# Patient Record
Sex: FEMALE | Race: ASIAN | NOT HISPANIC OR LATINO | URBAN - METROPOLITAN AREA
[De-identification: names, ages, dates, MRNs, and addresses within clinical notes are randomized per-mention and may not be internally consistent; named-entity substitution may affect disease eponyms.]

---

## 2019-08-13 ENCOUNTER — INPATIENT (INPATIENT)
Facility: HOSPITAL | Age: 84
LOS: 1 days | Discharge: HOME | DRG: 65 | End: 2019-08-15
Attending: PSYCHIATRY & NEUROLOGY | Admitting: PSYCHIATRY & NEUROLOGY
Payer: MEDICAID

## 2019-08-13 VITALS
RESPIRATION RATE: 15 BRPM | SYSTOLIC BLOOD PRESSURE: 162 MMHG | DIASTOLIC BLOOD PRESSURE: 92 MMHG | TEMPERATURE: 97 F | OXYGEN SATURATION: 98 % | HEART RATE: 78 BPM

## 2019-08-13 DIAGNOSIS — R09.89 OTHER SPECIFIED SYMPTOMS AND SIGNS INVOLVING THE CIRCULATORY AND RESPIRATORY SYSTEMS: ICD-10-CM

## 2019-08-13 DIAGNOSIS — Z90.710 ACQUIRED ABSENCE OF BOTH CERVIX AND UTERUS: Chronic | ICD-10-CM

## 2019-08-13 LAB
ALBUMIN SERPL ELPH-MCNC: 4 G/DL — SIGNIFICANT CHANGE UP (ref 3.3–5)
ALP SERPL-CCNC: 65 U/L — SIGNIFICANT CHANGE UP (ref 40–120)
ALT FLD-CCNC: 24 U/L — SIGNIFICANT CHANGE UP (ref 10–45)
ANION GAP SERPL CALC-SCNC: 9 MMOL/L — SIGNIFICANT CHANGE UP (ref 5–17)
APPEARANCE UR: CLEAR — SIGNIFICANT CHANGE UP
APTT BLD: 22.1 SEC — LOW (ref 27.5–36.3)
AST SERPL-CCNC: 36 U/L — SIGNIFICANT CHANGE UP (ref 10–40)
BACTERIA # UR AUTO: NEGATIVE — SIGNIFICANT CHANGE UP
BASOPHILS # BLD AUTO: 0 K/UL — SIGNIFICANT CHANGE UP (ref 0–0.2)
BASOPHILS NFR BLD AUTO: 0.4 % — SIGNIFICANT CHANGE UP (ref 0–2)
BILIRUB SERPL-MCNC: 0.2 MG/DL — SIGNIFICANT CHANGE UP (ref 0.2–1.2)
BILIRUB UR-MCNC: NEGATIVE — SIGNIFICANT CHANGE UP
BUN SERPL-MCNC: 12 MG/DL — SIGNIFICANT CHANGE UP (ref 7–23)
CALCIUM SERPL-MCNC: 9.4 MG/DL — SIGNIFICANT CHANGE UP (ref 8.4–10.5)
CHLORIDE SERPL-SCNC: 103 MMOL/L — SIGNIFICANT CHANGE UP (ref 96–108)
CO2 SERPL-SCNC: 25 MMOL/L — SIGNIFICANT CHANGE UP (ref 22–31)
COLOR SPEC: COLORLESS — SIGNIFICANT CHANGE UP
CREAT SERPL-MCNC: 0.85 MG/DL — SIGNIFICANT CHANGE UP (ref 0.5–1.3)
DIFF PNL FLD: NEGATIVE — SIGNIFICANT CHANGE UP
EOSINOPHIL # BLD AUTO: 0.1 K/UL — SIGNIFICANT CHANGE UP (ref 0–0.5)
EOSINOPHIL NFR BLD AUTO: 1.6 % — SIGNIFICANT CHANGE UP (ref 0–6)
EPI CELLS # UR: 1 /HPF — SIGNIFICANT CHANGE UP
GLUCOSE SERPL-MCNC: 105 MG/DL — HIGH (ref 70–99)
GLUCOSE UR QL: NEGATIVE — SIGNIFICANT CHANGE UP
HCT VFR BLD CALC: 41.4 % — SIGNIFICANT CHANGE UP (ref 34.5–45)
HGB BLD-MCNC: 13.6 G/DL — SIGNIFICANT CHANGE UP (ref 11.5–15.5)
HYALINE CASTS # UR AUTO: 0 /LPF — SIGNIFICANT CHANGE UP (ref 0–2)
INR BLD: 0.91 RATIO — SIGNIFICANT CHANGE UP (ref 0.88–1.16)
KETONES UR-MCNC: NEGATIVE — SIGNIFICANT CHANGE UP
LEUKOCYTE ESTERASE UR-ACNC: NEGATIVE — SIGNIFICANT CHANGE UP
LYMPHOCYTES # BLD AUTO: 2.2 K/UL — SIGNIFICANT CHANGE UP (ref 1–3.3)
LYMPHOCYTES # BLD AUTO: 35.5 % — SIGNIFICANT CHANGE UP (ref 13–44)
MCHC RBC-ENTMCNC: 32.1 PG — SIGNIFICANT CHANGE UP (ref 27–34)
MCHC RBC-ENTMCNC: 32.8 GM/DL — SIGNIFICANT CHANGE UP (ref 32–36)
MCV RBC AUTO: 97.9 FL — SIGNIFICANT CHANGE UP (ref 80–100)
MONOCYTES # BLD AUTO: 0.6 K/UL — SIGNIFICANT CHANGE UP (ref 0–0.9)
MONOCYTES NFR BLD AUTO: 9 % — SIGNIFICANT CHANGE UP (ref 2–14)
NEUTROPHILS # BLD AUTO: 3.3 K/UL — SIGNIFICANT CHANGE UP (ref 1.8–7.4)
NEUTROPHILS NFR BLD AUTO: 53.5 % — SIGNIFICANT CHANGE UP (ref 43–77)
NITRITE UR-MCNC: NEGATIVE — SIGNIFICANT CHANGE UP
PH UR: 7 — SIGNIFICANT CHANGE UP (ref 5–8)
PLATELET # BLD AUTO: 186 K/UL — SIGNIFICANT CHANGE UP (ref 150–400)
POTASSIUM SERPL-MCNC: 4.7 MMOL/L — SIGNIFICANT CHANGE UP (ref 3.5–5.3)
POTASSIUM SERPL-SCNC: 4.7 MMOL/L — SIGNIFICANT CHANGE UP (ref 3.5–5.3)
PROT SERPL-MCNC: 6.9 G/DL — SIGNIFICANT CHANGE UP (ref 6–8.3)
PROT UR-MCNC: NEGATIVE — SIGNIFICANT CHANGE UP
PROTHROM AB SERPL-ACNC: 10.4 SEC — SIGNIFICANT CHANGE UP (ref 10–12.9)
RBC # BLD: 4.23 M/UL — SIGNIFICANT CHANGE UP (ref 3.8–5.2)
RBC # FLD: 12.7 % — SIGNIFICANT CHANGE UP (ref 10.3–14.5)
RBC CASTS # UR COMP ASSIST: 2 /HPF — SIGNIFICANT CHANGE UP (ref 0–4)
SODIUM SERPL-SCNC: 137 MMOL/L — SIGNIFICANT CHANGE UP (ref 135–145)
SP GR SPEC: 1.02 — SIGNIFICANT CHANGE UP (ref 1.01–1.02)
UROBILINOGEN FLD QL: NEGATIVE — SIGNIFICANT CHANGE UP
WBC # BLD: 6.2 K/UL — SIGNIFICANT CHANGE UP (ref 3.8–10.5)
WBC # FLD AUTO: 6.2 K/UL — SIGNIFICANT CHANGE UP (ref 3.8–10.5)
WBC UR QL: 2 /HPF — SIGNIFICANT CHANGE UP (ref 0–5)

## 2019-08-13 PROCEDURE — 0042T: CPT

## 2019-08-13 PROCEDURE — 70450 CT HEAD/BRAIN W/O DYE: CPT | Mod: 26

## 2019-08-13 PROCEDURE — 99291 CRITICAL CARE FIRST HOUR: CPT

## 2019-08-13 RX ORDER — SODIUM CHLORIDE 9 MG/ML
1000 INJECTION INTRAMUSCULAR; INTRAVENOUS; SUBCUTANEOUS
Refills: 0 | Status: DISCONTINUED | OUTPATIENT
Start: 2019-08-13 | End: 2019-08-15

## 2019-08-13 NOTE — H&P ADULT - NSHPLABSRESULTS_GEN_ALL_CORE
13.6   6.2   )-----------( 186      ( 13 Aug 2019 22:40 )             41.4   08-13    137  |  103  |  12  ----------------------------<  105<H>  4.7   |  25  |  0.85    Ca    9.4      13 Aug 2019 22:40    TPro  6.9  /  Alb  4.0  /  TBili  0.2  /  DBili  x   /  AST  36  /  ALT  24  /  AlkPhos  65  08-13  PT/INR - ( 13 Aug 2019 22:40 )   PT: 10.4 sec;   INR: 0.91 ratio         PTT - ( 13 Aug 2019 22:40 )  PTT:22.1 sec    < from: CT Brain Stroke Protocol (08.13.19 @ 23:01) >    IMPRESSION:   No acute intracranial hemorrhage in this patient status post TPA   administration for left middle cerebral artery occlusion. Early ischemic   change in the anterior left insular cortex and posterior limb of the   internal capsule.    < end of copied text >

## 2019-08-13 NOTE — H&P ADULT - HISTORY OF PRESENT ILLNESS
84 year old ?RH [place] citizen lady with Hx of Seizures on dilantin, HTN, HLD, and hysterectomy presenting to the ED with son-in-law c/o sudden onset verbal unresponsiveness at 19:00 today. Per son in law, [place] [facility] 1.5 days ago. Patient was watching TV with her grandchildren earlier this evening, when they notice she was not responding to them. 84 year old ?RH Guyanese lady (reports her name as Abbie) with Hx of Seizures on dilantin, HTN, HLD, and hysterectomy presented to the [facility] ED with son-in-law c/o sudden onset verbal unresponsiveness at 19:00 today. Per son in law, [place] [facility] 1.5 days ago. Patient was watching TV with her grandchildren earlier this evening, when they notice she was not responding to them. Initial NIHSS deemed to be 12. CTH/CTA showed distal LM1 occlusion + dense MCA sign. Given TPA and transferred to [facility] for possible endovascular. On arrival, pt alert awake, states she was feeling very drowsy night prior; responding to most questions in limited english (Maori  not available from pacific interpreters), crossed midline, mild R nasolabial flattening, otherwise symmetrically full strength on gross motor exam. CTP study w/o any mismatch or core; combined with significant neurological improvement; pt excluded from endovascular intervention. Reported baseline independent of ADLs. No family available at bedside and multiple attempts to reach son-in-law via [phone] number unsuccessful. Admit to stroke unit for further management. NIHSS =1, MRS =0    **Possible error in CTA report from [facility] - mentions RM1 occlusion in impression as well as LMca 84 year old ?RH Omani lady (reports her name as Abbie) with Hx of Seizures on dilantin, HTN, HLD, and hysterectomy presented to the [facility] ED with son-in-law c/o sudden onset verbal unresponsiveness at 19:00 today. Per son in law, [place] [facility] 1.5 days ago. Patient was watching TV with her grandchildren earlier this evening, when they notice she was not responding to them. Initial NIHSS deemed to be 12. CTH/CTA showed distal LM1 occlusion + dense MCA sign. Given TPA and transferred to [facility] for possible endovascular. On arrival, pt alert awake, states she was feeling very drowsy night prior; responding to most questions in limited english (Persian  not available from pacific interpreters), crossed midline, mild R nasolabial flattening, otherwise symmetrically full strength on gross motor exam. CTP study w/o any mismatch or core; combined with significant neurological improvement; pt excluded from endovascular intervention. Reported baseline independent of ADLs. No family available at bedside and multiple attempts to reach son-in-law via [phone] number unsuccessful. Admit to stroke unit for further management. NIHSS =1, MRS =0    **Possible error in CTA report from [facility] - mentions RM1 occlusion in impression as well as LMca   [facility] name : Casandra Ortega MRN #[id number] 84 year old ?RH Fijian lady  with Hx of Seizures on dilantin, HTN, HLD, CAD s/p PPM and hysterectomy presented to the [facility] ED with son-in-law c/o sudden onset verbal unresponsiveness at 19:00 today. Per son in law, [place] [facility] 1.5 days ago. Patient was watching TV with her grandchildren earlier this evening, when they notice she was not responding to them. Initial NIHSS deemed to be 12. CTH/CTA showed distal LM1 occlusion + dense MCA sign. Given TPA and transferred to [facility] for possible endovascular. On arrival, pt alert awake, states she was feeling very drowsy night prior; responding to most questions in limited english (Georgian  not available from pacific interpreters), crossed midline, mild R nasolabial flattening, otherwise symmetrically full strength on gross motor exam. CTP study w/o any mismatch or core; combined with significant neurological improvement; pt excluded from endovascular intervention. Reported baseline independent of ADLs. Admit to stroke unit for further management. NIHSS =1, MRS =0    **Possible error in CTA report from [facility] - mentions RM1 occlusion in impression as well as LMca   [facility] name : Casandra Ortega MRN #[id number]

## 2019-08-13 NOTE — ED ADULT NURSE NOTE - OBJECTIVE STATEMENT
83 y/o Maldivian speaking female presents to ED via EMS as transfer Code Stroke from [facility]. As per EMS,1900 was last seen normal, pt became aphasic and minimally responsive to family members with limited movement of b/l lower extremities. TPA started at 2045, infusion complete at approximately t 2140 as per EMS. Upon arrival to [facility], Code Stroke called at 2220. Pt alert and following commands. +assymetrical smile, +sensation to touch. +PERRLA 2mm, +strong hand  b/l, weak movement of lower extremities up b/l.   Non labored respirations, no noted cough, SOB, or edema. Safety measures maintained. 85 y/o Canadian speaking female with hx of seizures and HTN presents to ED via EMS as transfer Code Stroke from [facility]. As per EMS,1900 was last seen normal, pt became aphasic and minimally responsive to family members with limited movement of b/l lower extremities. TPA started at 2045, infusion complete at approximately 2140 as per EMS. Upon arrival to [facility], Code Stroke called at 2220. Pt alert and following commands. +asymmetrical smile, +sensation to touch, +PERRLA 2mm. +ROMx4 extremities, +strong hand  b/l, weak movement of lower extremities up b/l. Non labored respirations, no noted cough, SOB, or edema. Safety measures maintained. 83 y/o Finnish speaking female with hx of seizures and HTN presents to ED via EMS as transfer Code Stroke from [facility]. As per EMS,1900 was last seen normal, pt became aphasic and minimally responsive to family members with limited movement of b/l lower extremities. TPA started at 2045 at [facility], infusion complete at approximately 2140 as per EMS. Upon arrival to [facility], Code Stroke called at 2220. Pt alert and following commands, speaks some broken English and understands when speaking with. +asymmetrical smile, +sensation to touch, +PERRLA 2mm. +ROMx4 extremities, +strong hand  b/l, +flexion/extension b/l. Non labored respirations, no noted cough, SOB, or edema. No fevers, chills, n/v/d. Pt does not remember aphasic episode earlier but states that they felt some right neck pain last night, currently no c/o of pain, numbness, tingling or weakness. Safety measures maintained. 83 y/o Icelandic speaking female with hx of seizures and HTN presents to ED via EMS as transfer Code Stroke from [facility]. As per EMS,1900 was last seen normal, pt became aphasic and minimally responsive to family members with limited movement of b/l lower extremities. As per EMS and transfer documentation TPA bolus 5.7mg given at 2045, infusion 51.4mg started 2045 and complete at approximately 2140 as per EMS. Upon arrival to [facility], Code Stroke called at 2220. Pt alert and following commands, speaks some broken English and understands when speaking with. +asymmetrical smile, +sensation to touch, +PERRLA 2mm. +ROMx4 extremities, +strong hand  b/l, +flexion/extension b/l. Non labored respirations, no noted cough, SOB, or edema. No fevers, chills, n/v/d. Pt does not remember aphasic episode earlier but states that they felt some right neck pain last night, currently no c/o of pain, numbness, tingling or weakness. Safety measures maintained. 85 y/o Cymraes speaking female with hx of seizures and HTN presents to ED via EMS as transfer Code Stroke from [facility]. As per EMS,1900 was last seen normal, pt became aphasic and minimally responsive to family members with limited movement of b/l lower extremities. As per EMS and transfer documentation TPA bolus 5.7mg given at 2045, infusion 51.4mg started at 2045 and completec at approximately 2140 as per EMS. Upon arrival to [facility], Code Stroke called at 2220. Pt alert and following commands, speaks some broken English and understands when speaking with. +asymmetrical smile, +sensation to touch, +PERRLA 2mm. +ROMx4 extremities, +strong hand  b/l, +flexion/extension b/l. Non labored respirations, no noted cough, SOB, or edema. No fevers, chills, n/v/d. Pt does not remember aphasic episode earlier but states that they felt some right neck pain last night, currently no c/o of pain, numbness, tingling or weakness. Safety measures maintained. 85 y/o Belarusian speaking female with hx of seizures and HTN presents to ED via EMS as transfer Code Stroke from [facility]. As per EMS,1900 was last seen normal, pt became aphasic and minimally responsive to family members with limited movement of b/l lower extremities. As per EMS and transfer documentation TPA bolus 5.7mg given at 2045, infusion 51.4mg started at 2045 and completed at approximately 2140 as per EMS. Upon arrival to [facility], Code Stroke called at 2220. Pt alert and following commands, speaks some broken English and understands when speaking with. +asymmetrical smile, +sensation to touch, +PERRLA 2mm. +ROMx4 extremities, +strong hand  b/l, +flexion/extension b/l. Non labored respirations, no noted cough, SOB, or edema. No fevers, chills, n/v/d. Pt does not remember aphasic episode earlier but states that they felt some right neck pain last night, currently no c/o of pain, numbness, tingling or weakness. Safety measures maintained.

## 2019-08-13 NOTE — ED PROVIDER NOTE - ATTENDING CONTRIBUTION TO CARE
Dr. Plummer (Attending Physician)  I performed a history and physical exam of the patient and discussed their management with the advanced care provider. I reviewed the advanced care provider's note and agree with the documented findings and plan of care. My medical decision making and objective findings are found above.

## 2019-08-13 NOTE — ED PROVIDER NOTE - CLINICAL SUMMARY MEDICAL DECISION MAKING FREE TEXT BOX
Dr. Plummer (Attending Physician)  Pt. pw aphasia bilateral leg weakness s/p TPA now following commands speaking with NIHSS 0.  Will repeat CTA head and neck, CT perfusion but unlikely to need additional neurointerventional procedure.  Admit to stroke unit.

## 2019-08-13 NOTE — H&P ADULT - NSICDXPASTSURGICALHX_GEN_ALL_CORE_FT
PAST SURGICAL HISTORY:  History of hysterectomy PAST SURGICAL HISTORY:  Cardiac pacemaker     History of hysterectomy

## 2019-08-13 NOTE — ED PROVIDER NOTE - NEUROLOGICAL, MLM
5/5 strength b/l UE and LE.  Coordination intact with finger to nose.  Slight right sided facial droop.

## 2019-08-13 NOTE — ED PROVIDER NOTE - OBJECTIVE STATEMENT
84 year old female with Hx of Seizures on Dilantin, HTN, HLD, and hysterectomy presenting to the ED at [place] with son-in-law c/o sudden onset verbal unresponsiveness at 19:00 today.  Patient was watching TV with her grandchildren earlier this evening, when they notice she was not responding to them.  At [place] she was found to have 0/5 strength b/l LE's and some dysarthria.  Strength in UE's remained intact, had a normal CT and was given TPA and transferred here.  Here now with strength in b/l LE and speaking to us with the use of  services.

## 2019-08-13 NOTE — H&P ADULT - NSICDXPASTMEDICALHX_GEN_ALL_CORE_FT
PAST MEDICAL HISTORY:  HLD (hyperlipidemia)     HTN (hypertension)     Seizures PAST MEDICAL HISTORY:  CAD (coronary artery disease)     HLD (hyperlipidemia)     HTN (hypertension)     Seizures

## 2019-08-13 NOTE — H&P ADULT - ASSESSMENT
84 year old ?RH Surinamese lady (reports her name as Abbie) with Hx of Seizures on dilantin, HTN, HLD, and hysterectomy presented to the [facility] ED with son-in-law c/o sudden onset verbal unresponsiveness at 19:00 today.     Impression: Global aphasia with R hemiparesis s/p TPA, now apparently with near complete resolution of symptoms likely from LM1 occlusion, etiology possibly ESUS.     - goal BP < 180/110  - Neuro checks q2hr in Stroke Unit  - Dysphagia screen in 12-24hrs    Imaging/Studies  - MRI brain w/o cont  - Repeat Head CT or MRI in 24hrs evaluate for hemorrhagic conversion or get CTH earlier for change in mental status  - TTE w/ bubble  - Telemetry monitoring   - PT/ OT    Labs  - HgA1c, Lipid profile, TSH, B12    Meds  - ASA 81mg after 24hrs if no significant bleeding on repeat imaging  - Atorvastatin 80mg QHS  - Dilantin - pending dose clearance    Med rec pending - no family available - [place] from [place]    d/w Dr. Hardin 84 year old ?RH Beninese lady  with Hx of Seizures on dilantin, HTN, HLD, CAD s/p PPM and hysterectomy presented to the [facility] ED with son-in-law c/o sudden onset verbal unresponsiveness at 19:00 today.     Impression: Global aphasia with R hemiparesis s/p TPA, now apparently with near complete resolution of symptoms likely from LM1 occlusion, etiology possibly ESUS.     - goal BP < 180/110  - Neuro checks q2hr in Stroke Unit  - Dysphagia screen in 12-24hrs  - Cardiology consult due to hx of CAD, EKG, - ok to resume angina meds?    Imaging/Studies  - unable to obtain MRI due to foreign placed pacemaker (placed several years ago)  - Repeat Head CT in 24hrs evaluate for hemorrhagic conversion or get CTH earlier for change in mental status  - TTE w/ bubble  - Telemetry monitoring   - PT/ OT    Labs  - HgA1c, Lipid profile, TSH, B12    Meds  - ASA 81mg after 24hrs if no significant bleeding on repeat imaging  - Atorvastatin 80mg QHS  - Dilantin - 100mg BID  - Angina meds to be started as per cardiology    Med rec completed as confirmed with pt and family.    d/w Dr. Hardin 84 year old ?RH Grenadian lady  with Hx of Seizures on dilantin, HTN, HLD, CAD s/p PPM and hysterectomy presented to the [facility] ED with son-in-law c/o sudden onset verbal unresponsiveness at 19:00 today.     Impression: Global aphasia with R hemiparesis s/p TPA, now apparently with near complete resolution of symptoms likely from LM1 occlusion, etiology possibly ESUS.     - goal BP < 180/110  - Neuro checks q2hr in Stroke Unit  - Dysphagia screen in 12-24hrs  - Cardiology consult due to hx of CAD, EKG, - ok to resume angina meds?    Imaging/Studies  - unable to obtain MRI due to foreign placed pacemaker (placed several years ago)  - Repeat Head CT in 24hrs evaluate for hemorrhagic conversion or get CTH earlier for change in mental status  - TTE w/ bubble  - LE Doppler given acute stroke, recent prolonged air travel  - Telemetry monitoring   - PT/ OT    Labs  - HgA1c, Lipid profile, TSH, B12    Meds  - ASA 81mg after 24hrs if no significant bleeding on repeat imaging  - Atorvastatin 80mg QHS  - Dilantin - 100mg BID  - Angina meds to be started as per cardiology    Med rec completed as confirmed with pt and family.    d/w Dr. Hardin

## 2019-08-13 NOTE — H&P ADULT - ATTENDING COMMENTS
Ms. Leiva is a 84 year old female with Hx of Seizures on Dilantin, HTN, HLD, and hysterectomy presenting to [place] ED with son-in-law c/o sudden onset verbal unresponsiveness at 19:00 today. Per son in law, [place] [facility] 1.5 days ago. Patient was watching TV with her grandchildren earlier this evening, when they notice she was not responding to them.  Yesterday at [facility] her exam was significant for being alert, awake, global aphasia, right arm at least antigravity, right leg 2/5 . today, her neurological exam is significantly improved to a stroke scale of 1 (mild right facial asymmetry. she is able to have fluent conversation and her comprehension seems intact.  Assessment: Left MCA Ischemia with Left M1 distal occlusion versus complex partial seizure  Plan:   - Risks, benefits and adverse reactions associated with IV tPA including hemorrhagic stroke were discussed with available family member in detail. After ruling out contraindications and obtaining verbal consent, patient was treated with IV tPA  - Cont with IV tPA precautions including BP goal<185/110 mmHg before the bolus  -she was not a candidate for neuro-interventional surgery due to rapid clinical improvement for stroke scale one  - No antiplatelets or anticoagulants at this time, Aspirin to be considered 24 hours after the IV tPA and repeat brain imaging  - DVT prophylaxis with s/c heparin to be considered in 24 hours from IV tPA after brain imaging  - Consider Atorvastatin 80 mg after establishing enteral access or passing swallow eval  - Allow permissive HTN up to 180/105 mmHg   - Cont with IV hydration  -she will need complete embolic workup, EEG routine; [facility]. She reports her last seizure was approximately 10 years back.

## 2019-08-13 NOTE — H&P ADULT - NSHPPHYSICALEXAM_GEN_ALL_CORE
exam limited by language barrier  General: NAD, appears very comfortable  no resp distress noted  Awake alert, oriented to self (russell) and hospital , follows most commands, naming, comprehension preserved, speech grossly fluent, no dysarthria noted, crosses midline, no neglect noted  EOMI, PERRLA, b/l blink to threat present, mild R nasolabial flattening  Full strength throughout, no drift noted  no dysmetria to F2n  toes downgoing

## 2019-08-13 NOTE — ED PROVIDER NOTE - CRITICAL CARE PROVIDED
consult w/ pt's family directly relating to pts condition/documentation/interpretation of diagnostic studies/additional history taking/consultation with other physicians/direct patient care (not related to procedure)

## 2019-08-14 DIAGNOSIS — Z95.0 PRESENCE OF CARDIAC PACEMAKER: Chronic | ICD-10-CM

## 2019-08-14 DIAGNOSIS — I63.9 CEREBRAL INFARCTION, UNSPECIFIED: ICD-10-CM

## 2019-08-14 LAB
ALBUMIN SERPL ELPH-MCNC: 3.6 G/DL — SIGNIFICANT CHANGE UP (ref 3.3–5)
ALP SERPL-CCNC: 66 U/L — SIGNIFICANT CHANGE UP (ref 40–120)
ALT FLD-CCNC: 22 U/L — SIGNIFICANT CHANGE UP (ref 10–45)
ANION GAP SERPL CALC-SCNC: 9 MMOL/L — SIGNIFICANT CHANGE UP (ref 5–17)
AST SERPL-CCNC: 33 U/L — SIGNIFICANT CHANGE UP (ref 10–40)
BASOPHILS # BLD AUTO: 0.02 K/UL — SIGNIFICANT CHANGE UP (ref 0–0.2)
BASOPHILS NFR BLD AUTO: 0.3 % — SIGNIFICANT CHANGE UP (ref 0–2)
BILIRUB SERPL-MCNC: 0.3 MG/DL — SIGNIFICANT CHANGE UP (ref 0.2–1.2)
BLD GP AB SCN SERPL QL: NEGATIVE — SIGNIFICANT CHANGE UP
BUN SERPL-MCNC: 11 MG/DL — SIGNIFICANT CHANGE UP (ref 7–23)
CALCIUM SERPL-MCNC: 9.5 MG/DL — SIGNIFICANT CHANGE UP (ref 8.4–10.5)
CHLORIDE SERPL-SCNC: 105 MMOL/L — SIGNIFICANT CHANGE UP (ref 96–108)
CHOLEST SERPL-MCNC: 174 MG/DL — SIGNIFICANT CHANGE UP (ref 10–199)
CO2 SERPL-SCNC: 24 MMOL/L — SIGNIFICANT CHANGE UP (ref 22–31)
CREAT SERPL-MCNC: 0.79 MG/DL — SIGNIFICANT CHANGE UP (ref 0.5–1.3)
EOSINOPHIL # BLD AUTO: 0.08 K/UL — SIGNIFICANT CHANGE UP (ref 0–0.5)
EOSINOPHIL NFR BLD AUTO: 1.4 % — SIGNIFICANT CHANGE UP (ref 0–6)
FOLATE SERPL-MCNC: 10.6 NG/ML — SIGNIFICANT CHANGE UP
GLUCOSE SERPL-MCNC: 92 MG/DL — SIGNIFICANT CHANGE UP (ref 70–99)
HBA1C BLD-MCNC: 5.3 % — SIGNIFICANT CHANGE UP (ref 4–5.6)
HCT VFR BLD CALC: 42.8 % — SIGNIFICANT CHANGE UP (ref 34.5–45)
HDLC SERPL-MCNC: 85 MG/DL — SIGNIFICANT CHANGE UP
HGB BLD-MCNC: 13.8 G/DL — SIGNIFICANT CHANGE UP (ref 11.5–15.5)
IMM GRANULOCYTES NFR BLD AUTO: 0.2 % — SIGNIFICANT CHANGE UP (ref 0–1.5)
LIPID PNL WITH DIRECT LDL SERPL: 78 MG/DL — SIGNIFICANT CHANGE UP
LYMPHOCYTES # BLD AUTO: 2.22 K/UL — SIGNIFICANT CHANGE UP (ref 1–3.3)
LYMPHOCYTES # BLD AUTO: 37.9 % — SIGNIFICANT CHANGE UP (ref 13–44)
MAGNESIUM SERPL-MCNC: 2.2 MG/DL — SIGNIFICANT CHANGE UP (ref 1.6–2.6)
MCHC RBC-ENTMCNC: 32.1 PG — SIGNIFICANT CHANGE UP (ref 27–34)
MCHC RBC-ENTMCNC: 32.2 GM/DL — SIGNIFICANT CHANGE UP (ref 32–36)
MCV RBC AUTO: 99.5 FL — SIGNIFICANT CHANGE UP (ref 80–100)
MONOCYTES # BLD AUTO: 0.44 K/UL — SIGNIFICANT CHANGE UP (ref 0–0.9)
MONOCYTES NFR BLD AUTO: 7.5 % — SIGNIFICANT CHANGE UP (ref 2–14)
NEUTROPHILS # BLD AUTO: 3.08 K/UL — SIGNIFICANT CHANGE UP (ref 1.8–7.4)
NEUTROPHILS NFR BLD AUTO: 52.7 % — SIGNIFICANT CHANGE UP (ref 43–77)
PLATELET # BLD AUTO: 173 K/UL — SIGNIFICANT CHANGE UP (ref 150–400)
POTASSIUM SERPL-MCNC: 4.8 MMOL/L — SIGNIFICANT CHANGE UP (ref 3.5–5.3)
POTASSIUM SERPL-SCNC: 4.8 MMOL/L — SIGNIFICANT CHANGE UP (ref 3.5–5.3)
PROT SERPL-MCNC: 6.6 G/DL — SIGNIFICANT CHANGE UP (ref 6–8.3)
RBC # BLD: 4.3 M/UL — SIGNIFICANT CHANGE UP (ref 3.8–5.2)
RBC # FLD: 13.6 % — SIGNIFICANT CHANGE UP (ref 10.3–14.5)
RH IG SCN BLD-IMP: POSITIVE — SIGNIFICANT CHANGE UP
SODIUM SERPL-SCNC: 138 MMOL/L — SIGNIFICANT CHANGE UP (ref 135–145)
T4 AB SER-ACNC: 5.3 UG/DL — SIGNIFICANT CHANGE UP (ref 4.6–12)
TOTAL CHOLESTEROL/HDL RATIO MEASUREMENT: 2.1 RATIO — LOW (ref 3.3–7.1)
TRIGL SERPL-MCNC: 57 MG/DL — SIGNIFICANT CHANGE UP (ref 10–149)
TSH SERPL-MCNC: 2.59 UIU/ML — SIGNIFICANT CHANGE UP (ref 0.27–4.2)
VIT B12 SERPL-MCNC: 1230 PG/ML — SIGNIFICANT CHANGE UP (ref 232–1245)
WBC # BLD: 5.85 K/UL — SIGNIFICANT CHANGE UP (ref 3.8–10.5)
WBC # FLD AUTO: 5.85 K/UL — SIGNIFICANT CHANGE UP (ref 3.8–10.5)

## 2019-08-14 PROCEDURE — 95816 EEG AWAKE AND DROWSY: CPT | Mod: 26

## 2019-08-14 PROCEDURE — 93970 EXTREMITY STUDY: CPT | Mod: 26

## 2019-08-14 PROCEDURE — 70450 CT HEAD/BRAIN W/O DYE: CPT | Mod: 26

## 2019-08-14 PROCEDURE — 71045 X-RAY EXAM CHEST 1 VIEW: CPT | Mod: 26

## 2019-08-14 RX ORDER — ENOXAPARIN SODIUM 100 MG/ML
40 INJECTION SUBCUTANEOUS DAILY
Refills: 0 | Status: DISCONTINUED | OUTPATIENT
Start: 2019-08-14 | End: 2019-08-15

## 2019-08-14 RX ORDER — ATORVASTATIN CALCIUM 80 MG/1
80 TABLET, FILM COATED ORAL AT BEDTIME
Refills: 0 | Status: DISCONTINUED | OUTPATIENT
Start: 2019-08-14 | End: 2019-08-15

## 2019-08-14 RX ORDER — ASPIRIN/CALCIUM CARB/MAGNESIUM 324 MG
81 TABLET ORAL DAILY
Refills: 0 | Status: DISCONTINUED | OUTPATIENT
Start: 2019-08-14 | End: 2019-08-15

## 2019-08-14 RX ORDER — GLYCINE 1.5 G/100ML
45 IRRIGANT IRRIGATION
Qty: 0 | Refills: 0 | DISCHARGE

## 2019-08-14 RX ORDER — ACETAMINOPHEN 500 MG
650 TABLET ORAL EVERY 6 HOURS
Refills: 0 | Status: DISCONTINUED | OUTPATIENT
Start: 2019-08-14 | End: 2019-08-15

## 2019-08-14 RX ADMIN — Medication 100 MILLIGRAM(S): at 17:40

## 2019-08-14 RX ADMIN — Medication 81 MILLIGRAM(S): at 23:55

## 2019-08-14 RX ADMIN — Medication 100 MILLIGRAM(S): at 02:25

## 2019-08-14 RX ADMIN — SODIUM CHLORIDE 85 MILLILITER(S): 9 INJECTION INTRAMUSCULAR; INTRAVENOUS; SUBCUTANEOUS at 01:10

## 2019-08-14 RX ADMIN — ATORVASTATIN CALCIUM 80 MILLIGRAM(S): 80 TABLET, FILM COATED ORAL at 21:36

## 2019-08-14 RX ADMIN — ENOXAPARIN SODIUM 40 MILLIGRAM(S): 100 INJECTION SUBCUTANEOUS at 23:56

## 2019-08-14 NOTE — DIETITIAN INITIAL EVALUATION ADULT. - PERTINENT MEDS FT
MEDICATIONS  (STANDING):  atorvastatin 80 milliGRAM(s) Oral at bedtime  phenytoin   Capsule 100 milliGRAM(s) Oral every 12 hours  sodium chloride 0.9%. 1000 milliLiter(s) (85 mL/Hr) IV Continuous <Continuous>    MEDICATIONS  (PRN):  acetaminophen   Tablet .. 650 milliGRAM(s) Oral every 6 hours PRN Mild Pain (1 - 3)

## 2019-08-14 NOTE — DIETITIAN INITIAL EVALUATION ADULT. - OTHER INFO
Pt seen for: Stroke Unit length of stay   Adm dx: CVA    GI issues: denies N/V  Last BM:     Food allergies/Intolerances: NKFA   Vit/supplement PTA: B Complex    Diet PTA: no diet restrictions     Subjective/Objective information: pt reports fair appetite, eats 2-3 meals/day, snacks on fruit. Wears dentures sometimes has difficulty chewing hard foods. Unsure of usual wt, doesn't believe she has lost any wt, no change in how clothing fits.    Education: Not indicated at this time

## 2019-08-14 NOTE — OCCUPATIONAL THERAPY INITIAL EVALUATION ADULT - PRECAUTIONS/LIMITATIONS, REHAB EVAL
Given TPA and transferred to [facility] for possible endovascular. On arrival, pt alert awake, states she was feeling very drowsy night prior; responding to most questions in limited english, crossed midline, mild R nasolabial flattening, otherwise symmetrically full strength on gross motor exam. CTP study w/o any mismatch or core; combined with significant neurological improvement; pt excluded from endovascular intervention fall precautions/surgical precautions/Given TPA and transferred to [facility] for possible endovascular. On arrival, pt alert awake, states she was feeling very drowsy night prior; responding to most questions in limited english, crossed midline, mild R nasolabial flattening, otherwise symmetrically full strength on gross motor exam. CTP study w/o any mismatch or core; combined with significant neurological improvement; pt excluded from endovascular intervention

## 2019-08-14 NOTE — OCCUPATIONAL THERAPY INITIAL EVALUATION ADULT - TRANSFER TRAINING, PT EVAL
Pt will perform sit <-> stand transfers (I) within 4 weeks. Patient will transfer to toilet with DME as needed (I) within 4 weeks

## 2019-08-14 NOTE — PHYSICAL THERAPY INITIAL EVALUATION ADULT - ADDITIONAL COMMENTS
Pt recently arrived in [place] from [facility], now living with family. 2 steps to enter which pt states she requires assist with, first floor set-up inside home. Pt used straight cane for ambulation which she states she only used sometimes in the home.

## 2019-08-14 NOTE — PATIENT PROFILE ADULT - HEALTH LITERACY
You can access the [facility], offered by [facility], by registering with the following website: [email or web address]

## 2019-08-14 NOTE — PHYSICAL THERAPY INITIAL EVALUATION ADULT - PRECAUTIONS/LIMITATIONS, REHAB EVAL
Given TPA and transferred to [facility] for possible endovascular. On arrival, pt alert awake, states she was feeling very drowsy night prior; responding to most questions in limited english, crossed midline, mild R nasolabial flattening, otherwise symmetrically full strength on gross motor exam. CTP study w/o any mismatch or core; combined with significant neurological improvement; pt excluded from endovascular intervention CT Brain 8/13- No acute intracranial hemorrhage in this patient status post TPA administration for left middle cerebral artery occlusion. Early ischemic change in the anterior left insular cortex and posterior limb of the internal capsule

## 2019-08-14 NOTE — DIETITIAN INITIAL EVALUATION ADULT. - ENERGY NEEDS
Ht: 65"   Wt: 150   BMI: 25 kg/m2   IBW: 125  (+/-10%)     119% IBW  Edema: none   Skin: no pressure injuries documented

## 2019-08-14 NOTE — OCCUPATIONAL THERAPY INITIAL EVALUATION ADULT - ADDITIONAL COMMENTS
CT Brain 8/13- No acute intracranial hemorrhage in this patient status post TPA administration for left middle cerebral artery occlusion. Early ischemic change in the anterior left insular cortex and posterior limb of the internal capsule.

## 2019-08-14 NOTE — PHYSICAL THERAPY INITIAL EVALUATION ADULT - GENERAL OBSERVATIONS, REHAB EVAL
pt received semi-supine in bed in NAD, cleared for OOB by Stroke Team at 845am on 8/14/19 s/p TPA +cardiac monitor, BP cuff, continuous pulse ox monitor

## 2019-08-14 NOTE — OCCUPATIONAL THERAPY INITIAL EVALUATION ADULT - LIVES WITH, PROFILE
Pt visiting daughter from [facility]. 2 steps to enter, no stairs inside. (I) ADLs and transfers. Straight cane in community

## 2019-08-14 NOTE — OCCUPATIONAL THERAPY INITIAL EVALUATION ADULT - PERTINENT HX OF CURRENT PROBLEM, REHAB EVAL
83 yo F presented to the [facility] ED with son-in-law c/o sudden onset verbal unresponsiveness at 19:00 today. Per son in law, [place] [facility]. Patient was watching TV with her grandchildren earlier this evening, when they notice she was not responding to them. Initial NIHSS deemed to be 12. CTH/CTA showed distal LM1 occlusion + dense MCA sign

## 2019-08-14 NOTE — OCCUPATIONAL THERAPY INITIAL EVALUATION ADULT - NS ASR FOLLOW COMMAND OT EVAL
100% of the time/able to follow single-step instructions/Primary language is Hebrew, speaks basic English. Unable to complete Short Blessed Test

## 2019-08-14 NOTE — PHYSICAL THERAPY INITIAL EVALUATION ADULT - PERTINENT HX OF CURRENT PROBLEM, REHAB EVAL
85 yo F presented to the [facility] ED with son-in-law c/o sudden onset verbal unresponsiveness at 19:00 today. Per son in law, [place] [facility]. Patient was watching TV with her grandchildren earlier this evening, when they notice she was not responding to them. Initial NIHSS deemed to be 12. CTH/CTA showed distal LM1 occlusion + dense MCA sign

## 2019-08-15 VITALS
OXYGEN SATURATION: 97 % | DIASTOLIC BLOOD PRESSURE: 57 MMHG | RESPIRATION RATE: 18 BRPM | SYSTOLIC BLOOD PRESSURE: 104 MMHG | HEART RATE: 60 BPM

## 2019-08-15 DIAGNOSIS — R56.9 UNSPECIFIED CONVULSIONS: ICD-10-CM

## 2019-08-15 DIAGNOSIS — I10 ESSENTIAL (PRIMARY) HYPERTENSION: ICD-10-CM

## 2019-08-15 DIAGNOSIS — I63.9 CEREBRAL INFARCTION, UNSPECIFIED: ICD-10-CM

## 2019-08-15 DIAGNOSIS — Z95.0 PRESENCE OF CARDIAC PACEMAKER: ICD-10-CM

## 2019-08-15 LAB
ANION GAP SERPL CALC-SCNC: 10 MMOL/L — SIGNIFICANT CHANGE UP (ref 5–17)
ANION GAP SERPL CALC-SCNC: 8 MMOL/L — SIGNIFICANT CHANGE UP (ref 5–17)
BUN SERPL-MCNC: 12 MG/DL — SIGNIFICANT CHANGE UP (ref 7–23)
BUN SERPL-MCNC: 12 MG/DL — SIGNIFICANT CHANGE UP (ref 7–23)
CALCIUM SERPL-MCNC: 10.2 MG/DL — SIGNIFICANT CHANGE UP (ref 8.4–10.5)
CALCIUM SERPL-MCNC: 10.7 MG/DL — HIGH (ref 8.4–10.5)
CHLORIDE SERPL-SCNC: 102 MMOL/L — SIGNIFICANT CHANGE UP (ref 96–108)
CHLORIDE SERPL-SCNC: 105 MMOL/L — SIGNIFICANT CHANGE UP (ref 96–108)
CO2 SERPL-SCNC: 23 MMOL/L — SIGNIFICANT CHANGE UP (ref 22–31)
CO2 SERPL-SCNC: 28 MMOL/L — SIGNIFICANT CHANGE UP (ref 22–31)
CREAT SERPL-MCNC: 0.75 MG/DL — SIGNIFICANT CHANGE UP (ref 0.5–1.3)
CREAT SERPL-MCNC: 0.82 MG/DL — SIGNIFICANT CHANGE UP (ref 0.5–1.3)
GLUCOSE SERPL-MCNC: 90 MG/DL — SIGNIFICANT CHANGE UP (ref 70–99)
GLUCOSE SERPL-MCNC: 99 MG/DL — SIGNIFICANT CHANGE UP (ref 70–99)
HCT VFR BLD CALC: 49.2 % — HIGH (ref 34.5–45)
HCT VFR BLD CALC: 49.3 % — HIGH (ref 34.5–45)
HGB BLD-MCNC: 15.5 G/DL — SIGNIFICANT CHANGE UP (ref 11.5–15.5)
HGB BLD-MCNC: 15.8 G/DL — HIGH (ref 11.5–15.5)
MCHC RBC-ENTMCNC: 31 PG — SIGNIFICANT CHANGE UP (ref 27–34)
MCHC RBC-ENTMCNC: 31.5 GM/DL — LOW (ref 32–36)
MCHC RBC-ENTMCNC: 31.5 PG — SIGNIFICANT CHANGE UP (ref 27–34)
MCHC RBC-ENTMCNC: 32 GM/DL — SIGNIFICANT CHANGE UP (ref 32–36)
MCV RBC AUTO: 98.4 FL — SIGNIFICANT CHANGE UP (ref 80–100)
MCV RBC AUTO: 98.4 FL — SIGNIFICANT CHANGE UP (ref 80–100)
PLATELET # BLD AUTO: 174 K/UL — SIGNIFICANT CHANGE UP (ref 150–400)
PLATELET # BLD AUTO: 203 K/UL — SIGNIFICANT CHANGE UP (ref 150–400)
POTASSIUM SERPL-MCNC: 5.3 MMOL/L — SIGNIFICANT CHANGE UP (ref 3.5–5.3)
POTASSIUM SERPL-MCNC: 5.4 MMOL/L — HIGH (ref 3.5–5.3)
POTASSIUM SERPL-SCNC: 5.3 MMOL/L — SIGNIFICANT CHANGE UP (ref 3.5–5.3)
POTASSIUM SERPL-SCNC: 5.4 MMOL/L — HIGH (ref 3.5–5.3)
RBC # BLD: 5 M/UL — SIGNIFICANT CHANGE UP (ref 3.8–5.2)
RBC # BLD: 5.01 M/UL — SIGNIFICANT CHANGE UP (ref 3.8–5.2)
RBC # FLD: 12.8 % — SIGNIFICANT CHANGE UP (ref 10.3–14.5)
RBC # FLD: 12.9 % — SIGNIFICANT CHANGE UP (ref 10.3–14.5)
SODIUM SERPL-SCNC: 138 MMOL/L — SIGNIFICANT CHANGE UP (ref 135–145)
SODIUM SERPL-SCNC: 138 MMOL/L — SIGNIFICANT CHANGE UP (ref 135–145)
WBC # BLD: 5.7 K/UL — SIGNIFICANT CHANGE UP (ref 3.8–10.5)
WBC # BLD: 6 K/UL — SIGNIFICANT CHANGE UP (ref 3.8–10.5)
WBC # FLD AUTO: 5.7 K/UL — SIGNIFICANT CHANGE UP (ref 3.8–10.5)
WBC # FLD AUTO: 6 K/UL — SIGNIFICANT CHANGE UP (ref 3.8–10.5)

## 2019-08-15 PROCEDURE — 70450 CT HEAD/BRAIN W/O DYE: CPT | Mod: 26

## 2019-08-15 RX ORDER — ATORVASTATIN CALCIUM 80 MG/1
1 TABLET, FILM COATED ORAL
Qty: 60 | Refills: 0
Start: 2019-08-15 | End: 2019-10-13

## 2019-08-15 RX ORDER — ASPIRIN/CALCIUM CARB/MAGNESIUM 324 MG
1 TABLET ORAL
Qty: 60 | Refills: 0
Start: 2019-08-15 | End: 2019-10-13

## 2019-08-15 RX ORDER — ASPIRIN/CALCIUM CARB/MAGNESIUM 324 MG
1 TABLET ORAL
Qty: 0 | Refills: 0 | DISCHARGE

## 2019-08-15 RX ORDER — ISOSORBIDE MONONITRATE 60 MG/1
1 TABLET, EXTENDED RELEASE ORAL
Qty: 0 | Refills: 0 | DISCHARGE

## 2019-08-15 RX ORDER — ATORVASTATIN CALCIUM 80 MG/1
20 TABLET, FILM COATED ORAL AT BEDTIME
Refills: 0 | Status: DISCONTINUED | OUTPATIENT
Start: 2019-08-15 | End: 2019-08-15

## 2019-08-15 RX ORDER — SENNA PLUS 8.6 MG/1
2 TABLET ORAL AT BEDTIME
Refills: 0 | Status: DISCONTINUED | OUTPATIENT
Start: 2019-08-15 | End: 2019-08-15

## 2019-08-15 RX ORDER — ATORVASTATIN CALCIUM 80 MG/1
1 TABLET, FILM COATED ORAL
Qty: 0 | Refills: 0 | DISCHARGE

## 2019-08-15 RX ORDER — ATENOLOL 25 MG/1
1 TABLET ORAL
Qty: 30 | Refills: 0
Start: 2019-08-15 | End: 2019-09-13

## 2019-08-15 RX ORDER — ISOSORBIDE MONONITRATE 60 MG/1
1 TABLET, EXTENDED RELEASE ORAL
Qty: 60 | Refills: 0
Start: 2019-08-15 | End: 2019-10-13

## 2019-08-15 RX ORDER — ATORVASTATIN CALCIUM 80 MG/1
40 TABLET, FILM COATED ORAL AT BEDTIME
Refills: 0 | Status: DISCONTINUED | OUTPATIENT
Start: 2019-08-15 | End: 2019-08-15

## 2019-08-15 RX ORDER — DOCUSATE SODIUM 100 MG
100 CAPSULE ORAL THREE TIMES A DAY
Refills: 0 | Status: DISCONTINUED | OUTPATIENT
Start: 2019-08-15 | End: 2019-08-15

## 2019-08-15 RX ADMIN — Medication 100 MILLIGRAM(S): at 11:08

## 2019-08-15 RX ADMIN — Medication 650 MILLIGRAM(S): at 05:13

## 2019-08-15 RX ADMIN — Medication 81 MILLIGRAM(S): at 11:08

## 2019-08-15 RX ADMIN — Medication 100 MILLIGRAM(S): at 05:01

## 2019-08-15 RX ADMIN — ENOXAPARIN SODIUM 40 MILLIGRAM(S): 100 INJECTION SUBCUTANEOUS at 11:08

## 2019-08-15 RX ADMIN — Medication 650 MILLIGRAM(S): at 04:43

## 2019-08-15 NOTE — DISCHARGE NOTE PROVIDER - HOSPITAL COURSE
84 year old female with Hx of Seizures on Dilantin, HTN, HLD, and hysterectomy presented to [place] ED with son-in-law c/o sudden onset verbal unresponsiveness at 19:00 today. Per son in law, [place] [facility] 1.5 days ago. Patient was watching TV with her grandchildren earlier this evening, when they notice she was not responding to them. Initial NIHSS was 12. CTP study w/o any mismatch or core; combined with significant neurological improvement; pt excluded from endovascular intervention. Reported baseline independent of ADLs. Admitted to stroke unit for further management. Upon presentation to [facility]: NIHSS =1, MRS =0.            Labs:    LDL: 78    A1C: 5.3        Imaging:     CTH:     No evidence for intracranial hemorrhage. No focal areas of loss of the gray     matter white matter junction are appreciated.     CTP:     Perfusion imaging predicted core infarct of 0 ml within the left MCA     Territory. Based on the perfusion mismatch, there is a predicted volume of 0     ml of critically hypoperfused tissue at risk. Mismatch ratio: None     DC home with assistance 84 year old female with Hx of Seizures on Dilantin, HTN, HLD, and hysterectomy presented to [place] ED with son-in-law c/o sudden onset verbal unresponsiveness at 19:00 today. Per son in law, patient recently [place] [facility] 1.5 days ago. Patient was watching TV with her grandchildren earlier this evening, when they notice she was not responding to them. Initial NIHSS was 12. CTP study w/o any mismatch or core; combined with significant neurological improvement; pt excluded from endovascular intervention. Reported baseline independent of ADLs. Admitted to stroke unit for further management. Upon presentation to [facility]: NIHSS =1, MRS =0.            Labs:    LDL: 78    A1C: 5.3        Imaging:     CTH:     No evidence for intracranial hemorrhage. No focal areas of loss of the gray     matter white matter junction are appreciated.     CTP:     Perfusion imaging predicted core infarct of 0 ml within the left MCA     Territory. Based on the perfusion mismatch, there is a predicted volume of 0     ml of critically hypoperfused tissue at risk. Mismatch ratio: None     Impression: Left MCA Ischemia with Left M1 distal occlusion versus complex partial seizure    LDL on admission 78- continued on home statin. Continue on home dose of Dilantin. EEG performed- will f/u results. MRI unable to be performed as patient has PPM.     Physical therapy/OT recommended home with assistance. Will follow up with Dr. Tapia for PPM interrogation.

## 2019-08-15 NOTE — PROVIDER CONTACT NOTE (OTHER) - ASSESSMENT
Potassium 5.4, Hemoglobin 15.8 Hematocrit 49.3.  Pt remains alert and oriented x4.  /78, HR 60, Resp 16 Pox 99.

## 2019-08-15 NOTE — PROGRESS NOTE ADULT - ASSESSMENT
84 year old female with Hx of Seizures on Dilantin, HTN, HLD, and hysterectomy presented to [place] ED with son-in-law c/o sudden onset verbal unresponsiveness at 19:00 today. Per son in law, [place] [facility] 1.5 days ago. Patient was watching TV with her grandchildren earlier this evening, when they notice she was not responding to them. Initial NIHSS was 12. CTP study w/o any mismatch or core; combined with significant neurological improvement; pt excluded from endovascular intervention. Reported baseline independent of ADLs. Admitted to stroke unit for further management. Upon presentation to [facility]: NIHSS =1, MRS =0.    Impression: Left MCA Ischemia with Left M1 distal occlusion versus complex partial seizure    NEURO: Neurologically? Continue close monitoring for neurologic deterioration, permissive HTN, LDL on admission 78- continued on home statin, MRI unable to be performed as patient has PPM. Physical therapy/OT recommended home with assistance.     ANTITHROMBOTIC THERAPY: ASA    PULMONARY: CXR clear, protecting airway, saturating well     CARDIOVASCULAR: check TTE to screen for cardiac source of embolism, cardiac monitoring shows paced rhythm                              SBP goal: Permissive HTN to gradual normotension    GASTROINTESTINAL:  dysphagia screen passed     Diet: Regular    RENAL: BUN/Cr within normal limits, noted with hyperkalemia- may be hemolyzed repeat pending, good urine output      Na Goal: Greater than 135     Cardoso: No    HEMATOLOGY: H/H without acute change, Platelets 174; LE doppler negative for DVT.      DVT ppx:  LMWH     ID: afebrile, no leukocytosis     OTHER:     DISPOSITION: Home once stable and workup is complete    CORE MEASURES:        Admission NIHSS: 1     TPA: YES at OSH     LDL/HDL: 78/85     Depression Screen: p     Statin Therapy: yes     Dysphagia Screen: PASS     Smoking NO      Afib NO     Stroke Education PENDING    Obtain screening lower extremity venous ultrasound in patients who meet 1 or more of the following criteria as patient is high risk for DVT/PE on admission:   [] History of DVT/PE  []Hypercoagulable states (Factor V Leiden, Cancer, OCP, etc. )  []Prolonged immobility (hemiplegia/hemiparesis/post operative or any other extended immobilization)  [] Transferred from outside facility (Rehab or Long term care)  [] Age </= to 50 84 year old female with Hx of Seizures on Dilantin, HTN, HLD, and hysterectomy presented to [place] ED with son-in-law c/o sudden onset verbal unresponsiveness at 19:00 today. Per son in law, patient recently came [place] [facility] 1.5 days ago. Patient was watching TV with her grandchildren earlier this evening, when they notice she was not responding to them. Initial NIHSS was 12. CTP study w/o any mismatch or core; combined with significant neurological improvement; pt excluded from endovascular intervention. Reported baseline independent of ADLs. Admitted to stroke unit for further management. Upon presentation to [facility]: NIHSS =1, MRS =0.    Impression: Left MCA Ischemia with Left M1 distal occlusion versus complex partial seizure    NEURO: Neurologically? Continue close monitoring for neurologic deterioration, permissive HTN, LDL on admission 78- continued on home statin. Continue on home dose of Dilantin. EEG performed- will f/u results. MRI unable to be performed as patient has PPM. Physical therapy/OT recommended home with assistance.     ANTITHROMBOTIC THERAPY: ASA    PULMONARY: CXR clear, protecting airway, saturating well     CARDIOVASCULAR: check TTE to screen for cardiac source of embolism, cardiac monitoring shows paced rhythm                              SBP goal: Permissive HTN to gradual normotension    GASTROINTESTINAL:  dysphagia screen passed     Diet: Regular    RENAL: BUN/Cr within normal limits, noted with hyperkalemia- may be hemolyzed repeat pending, good urine output      Na Goal: Greater than 135     Cardoso: No    HEMATOLOGY: H/H without acute change, Platelets 174; LE doppler negative for DVT.      DVT ppx:  LMWH     ID: afebrile, no leukocytosis     OTHER:     DISPOSITION: Home once stable and workup is complete    CORE MEASURES:        Admission NIHSS: 1     TPA: YES at OSH     LDL/HDL: 78/85     Depression Screen: p     Statin Therapy: yes     Dysphagia Screen: PASS     Smoking NO      Afib NO     Stroke Education PENDING    Obtain screening lower extremity venous ultrasound in patients who meet 1 or more of the following criteria as patient is high risk for DVT/PE on admission:   [] History of DVT/PE  []Hypercoagulable states (Factor V Leiden, Cancer, OCP, etc. )  []Prolonged immobility (hemiplegia/hemiparesis/post operative or any other extended immobilization)  [] Transferred from outside facility (Rehab or Long term care)  [] Age </= to 50 84 year old female with Hx of Seizures on Dilantin, HTN, HLD, and hysterectomy presented to [place] ED with son-in-law c/o sudden onset verbal unresponsiveness at 19:00 today. Per son in law, patient recently came [place] [facility] 1.5 days ago. Patient was watching TV with her grandchildren earlier this evening, when they notice she was not responding to them. Initial NIHSS was 12. CTP study w/o any mismatch or core; combined with significant neurological improvement; pt excluded from endovascular intervention. Reported baseline independent of ADLs. Admitted to stroke unit for further management. Upon presentation to [facility]: NIHSS =1, MRS =0.    Impression: Left MCA Ischemia with Left M1 distal occlusion versus complex partial seizure    NEURO: Neurologically without acute change, interval CTH stable, permissive HTN to gradual normotension, LDL on admission 78- continued on home statin. Continue on home dose of Dilantin. EEG showed no focal or epileptiform abnormalities recorded. Mild nonspecific diffuse or multifocal cerebral dysfunction. MRI unable to be performed as patient has PPM. Physical therapy/OT recommended home with assistance.     ANTITHROMBOTIC THERAPY: ASA    PULMONARY: CXR clear, protecting airway, saturating well     CARDIOVASCULAR: check TTE to screen for cardiac source of embolism, cardiac monitoring shows paced rhythm. Plan for outpatient followup with Dr. Tapia for PPM interrogation.                            SBP goal: Permissive HTN to gradual normotension    GASTROINTESTINAL:  dysphagia screen passed     Diet: Regular    RENAL: BUN/Cr within normal limits, noted with hyperkalemia- may be hemolyzed repeat pending, good urine output      Na Goal: Greater than 135     Cardoso: No    HEMATOLOGY: H/H without acute change, Platelets 203; LE doppler negative for DVT.      DVT ppx:  LMWH     ID: afebrile, no leukocytosis     OTHER: Plan endorsed to patient at bedside, all questions and concerns addressed.     DISPOSITION: Home with close outpatient followup.     CORE MEASURES:        Admission NIHSS: 1     TPA: YES at OSH     LDL/HDL: 78/85     Depression Screen: p     Statin Therapy: yes     Dysphagia Screen: PASS     Smoking NO      Afib NO     Stroke Education PENDING    Obtain screening lower extremity venous ultrasound in patients who meet 1 or more of the following criteria as patient is high risk for DVT/PE on admission:   [] History of DVT/PE  []Hypercoagulable states (Factor V Leiden, Cancer, OCP, etc. )  []Prolonged immobility (hemiplegia/hemiparesis/post operative or any other extended immobilization)  [] Transferred from outside facility (Rehab or Long term care)  [] Age </= to 50

## 2019-08-15 NOTE — EEG REPORT - NS EEG TEXT BOX
[facility]  Report of Routine EEG Study with Video      [facility]: [address], [address] [place] [address], Phone: [phone]  [facility]: [address] [place] [address], Phone: [phone]  Office: [address] [place] [address], [place] [address], [place] [address], Phone: [phone]    Patient Name: Patricia Hannah     Age: 84 year  : 1935  Patient ID: -, MRN #: MR# [id number], Location: [address] [place] [address]  Referring Physician: -  EEG #: [id number]    Study Date: 2019		    Technical Information:					  On Instrument: [id number]  Placement and Labeling of Electrodes:  The EEG was performed utilizing 20 channels referential EEG connections (coronal over temporal over parasagittal montage) using all standard 10-20 electrode placements with EKG.  Recording was at a sampling rate of 256 samples per second per channel.  Time synchronized digital video recording was done simultaneously with EEG recording.  A low light infrared camera was used for low light recording.  Joo and seizure detection algorithms were utilized.    History:  routine EEG performed bedside  COR: alert and oriented, pt speaks limited english, the  is not available in her language    no hv and no photic performed due to contraindications    85 y/o female p/w: global aphasia, s/p tpa  hx: seizures, HTN, HLD, CAD, pacemaker, hysterectomy      Medication	  Dilantin	    [Abbreviation Key:  PDR=alpha rhythm/posterior dominant rhythm. A-P=anterior posterior gradient.  Amplitude: ‘very low’:<20; ‘low’:20-50; ‘medium’:; ‘high’:>200uV.  Persistence for periodic/rhythmic patterns (% of epoch) ‘rare’:<1%; ‘occasional’:1-10%; ‘frequent’:10-50%; ‘abundant’:50-90%; ‘continuous’:>90%.  Persistence for sporadic discharges: ‘rare’:<1/hr; ‘occasional’:1/min-1/hr; ‘frequent’:>1/min; ‘abundant’:>1/10 sec.  GRDA=generalized rhythmic delta activity, LRDA=lateralized rhythmic delta activity, TIRDA=temporal intermittent rhythmic delta activity, FIRDA=frontal intermittent rhythmic activity. LPD=PLED=lateralized periodic discharges, GPD=generalized periodic discharges, BiPDs=BiPLEDs=bilateral independent periodic epileptiform discharges, SIRPID=stimulus induced rhythmic, periodic, or ictal appearing discharges.  Modifiers: +F=with fast component, +S=with spike component, +R=with rhythmic component.  S-B=burst suppression pattern. PFA: paroxysmal fast activity. Max=maximal. N1-drowsy, N2-stage II sleep, N3-slow wave sleep.  HV=hyperventilation, PS=photic stimulation]    Study Interpretation:    FINDINGS:      Background:   -The background was continuous, spontaneously variable and reactive. During wakefulness, the posterior dominant rhythm consisted of symmetric, well-modulated  7 Hz activity, with amplitude to 30 uV, that attenuated to eye opening.      Background Slowing:  -Mild diffuse theta and polymorphic delta slowing    Focal Slowing:   -None present.    Sleep Background:  -Drowsiness was characterized by fragmentation, attenuation, and slowing of the background activity.    -Sleep was characterized by the presence of vertex waves, symmetric spindles and K-complexes.    Other Non-Epileptiform Findings:  -None were present.      Interictal Epileptiform Activity:   -None were present.    Events:  -Clinical events: None recorded.  -Seizures: None recorded.    Activation Procedures:   -Hyperventilation was not performed.    -Photic stimulation was not performed.    Artifacts:  -Intermittent myogenic and movement artifacts were noted.    ECG:  -The heart rate on single channel ECG was predominantly between 60-70 BPM.    EEG Summary/Classification:  -Abnormal EEG: awake / drowsy / asleep   -Mild generalized slowing  ** PRELIMINARY REPORT**    EEG Impression/Clinical Correlate:  -No focal or epileptiform abnormalities recorded.  -Mild nonspecific diffuse or multifocal cerebral dysfunction.         ________________________________________      Emi Logan MD  [facility] Fellow.           ________________________  Bishop Long MD PhD  Attending Physician, [facility] [place] [facility]  Report of Routine EEG Study with Video      [facility]: [address], [address] [place] [address], Phone: [phone]  [facility]: [address] [place] [address], [place] [address], Phone: [phone]  Office: [address] [place] [address], [facility] [address], [place] [address], Phone: [phone]    Patient Name: Patricia Hannah     Age: 84 year  : 1935  Patient ID: -, MRN #: MR# [id number], Location: [address] [place] [address]  Referring Physician: -  EEG #: [id number]    Study Date: 2019		    Technical Information:					  On Instrument: [id number]  Placement and Labeling of Electrodes:  The EEG was performed utilizing 20 channels referential EEG connections (coronal over temporal over parasagittal montage) using all standard 10-20 electrode placements with EKG.  Recording was at a sampling rate of 256 samples per second per channel.  Time synchronized digital video recording was done simultaneously with EEG recording.  A low light infrared camera was used for low light recording.  Joo and seizure detection algorithms were utilized.    History:  routine EEG performed bedside  85 y/o female p/w: global aphasia, s/p tpa  hx: seizures, HTN, HLD, CAD, pacemaker, hysterectomy      Medication	  Dilantin	    [Abbreviation Key:  PDR=alpha rhythm/posterior dominant rhythm. A-P=anterior posterior gradient.  Amplitude: ‘very low’:<20; ‘low’:20-50; ‘medium’:; ‘high’:>200uV.  Persistence for periodic/rhythmic patterns (% of epoch) ‘rare’:<1%; ‘occasional’:1-10%; ‘frequent’:10-50%; ‘abundant’:50-90%; ‘continuous’:>90%.  Persistence for sporadic discharges: ‘rare’:<1/hr; ‘occasional’:1/min-1/hr; ‘frequent’:>1/min; ‘abundant’:>1/10 sec.  GRDA=generalized rhythmic delta activity, LRDA=lateralized rhythmic delta activity, TIRDA=temporal intermittent rhythmic delta activity, FIRDA=frontal intermittent rhythmic activity. LPD=PLED=lateralized periodic discharges, GPD=generalized periodic discharges, BiPDs=BiPLEDs=bilateral independent periodic epileptiform discharges, SIRPID=stimulus induced rhythmic, periodic, or ictal appearing discharges.  Modifiers: +F=with fast component, +S=with spike component, +R=with rhythmic component.  S-B=burst suppression pattern. PFA: paroxysmal fast activity. Max=maximal. N1-drowsy, N2-stage II sleep, N3-slow wave sleep.  HV=hyperventilation, PS=photic stimulation]    Study Interpretation:    FINDINGS:      Background:   -The background was continuous, spontaneously variable and reactive and consistent mainly of theta and some alpha activity. During wakefulness, the posterior dominant rhythm consisted of symmetric, well-modulated 7 Hz activity, with amplitude to 30 uV, that attenuated to eye opening.      Background Slowing:  -Mild diffuse theta and polymorphic delta slowing    Focal Slowing:   -None present.    Sleep Background:  -Drowsiness was characterized by fragmentation, attenuation, and slowing of the background activity.    -Sleep was characterized by the presence of fragments of vertex waves    Other Non-Epileptiform Findings:  -None were present.    Interictal Epileptiform Activity:   -None were present.    Events:  -Clinical events: None recorded.  -Seizures: None recorded.    Activation Procedures:   -Hyperventilation was not performed.    -Photic stimulation was not performed.    Artifacts:  -Intermittent myogenic and movement artifacts were noted.    ECG:  -The heart rate on single channel ECG was predominantly between 60-70 BPM.    EEG Summary/Classification:  -Abnormal EEG mostly in the asleep and drowsy states with brief periods of wakefulness due to  -Mild generalized slowing      EEG Impression/Clinical Correlate:  -No focal or epileptiform abnormalities recorded.  -Mild nonspecific diffuse or multifocal cerebral dysfunction.         ________________________________________      Emi Logan MD  [facility] Fellow.           ________________________  Bishop Long MD PhD  Attending Physician, [facility] [place]

## 2019-08-15 NOTE — CONSULT NOTE ADULT - ASSESSMENT
Assessment: Patient is a 84 year old RH [place]ian lady  with Hx of Seizures on dilantin, HTN, HLD, CAD s/p PPM and hysterectomy presented to the [facility] ED with son-in-law c/o sudden onset verbal unresponsiveness at 19:00 8/13. Initial NIHSS deemed to be 12. CTH/CTA showed distal LM1 occlusion + dense MCA sign. Given TPA and transferred to [facility] for possible endovascular. Neurology was consulted to evaluate if the patient had a seizure on 8/15. Her exam was remarkable for proximal right upper extremity weakness due to pain limitation. Otherwise her exam was unremarkable.    Impression: The patient may have had a seizure on stroke onset. Currently does not appear to have any focal neurologic deficits as her RUE weakness is pain limited. She has a long standing seizure history that is controlled by dilantin 100mg BID. She appears to have a break-through seizure in the setting of acute stroke based on the history.      Plan:   -24hr video EEG to monitor for any seizure activity   -Continue dilantin and obtain an AM lv      -Management & disposition NOT discussed with neuro attending. Assessment: Patient is a 84 year old RH Lebanese lady  with Hx of Seizures on dilantin, HTN, HLD, CAD s/p PPM and hysterectomy presented to the [facility] ED with son-in-law c/o sudden onset verbal unresponsiveness at 19:00 8/13. Initial NIHSS deemed to be 12. CTH/CTA showed distal LM1 occlusion + dense MCA sign. Given TPA and transferred to [facility] for possible endovascular. Neurology was consulted to evaluate if the patient had a seizure on 8/15. Her exam was remarkable for proximal right upper extremity weakness due to pain limitation. Otherwise her exam was unremarkable.    Impression: The patient may have had a seizure on stroke onset. Currently does not appear to have any focal neurologic deficits as her RUE weakness is pain limited. She has a long standing seizure history that is controlled by dilantin 100mg BID. She appears to have a break-through seizure in the setting of acute stroke based on the history.      Plan:   -24hr video EEG to monitor for any seizure activity   -Continue dilantin and obtain and AM level      -Management & disposition discussed with neuro attending.

## 2019-08-15 NOTE — DISCHARGE NOTE NURSING/CASE MANAGEMENT/SOCIAL WORK - NSDCDPATPORTLINK_GEN_ALL_CORE
You can access the [facility] Patient Portal, offered by [facility], by registering with the following website: [email or web address]

## 2019-08-15 NOTE — DISCHARGE NOTE PROVIDER - CARE PROVIDER_API CALL
Yulissa Rodgers (NP; RN)  NP in Family Health  [address], [address]  [place] [address]  Phone: [phone]  Fax: [phone]  Follow Up Time:     Reynaldo Tapia (DO)  Cardiology; Internal Medicine  [address] [place] [address], [address]  [place] [address]  Phone: [phone]  Fax: [phone]  Follow Up Time:

## 2019-08-15 NOTE — CONSULT NOTE ADULT - SUBJECTIVE AND OBJECTIVE BOX
CHIEF COMPLAINT:  CVA      HISTORY OF PRESENT ILLNESS:    84 year old female with Hx of Seizures on dilantin, HTN, HLD, ? SSS s/p PPM presented to the [facility] ED with son-in-law c/o sudden onset verbal unresponsiveness at 19:00 on 8/13. Per son in law, [place] [facility] 1.5 days prior to admission. Patient was watching TV with her grandchildren when they noticed she was not responding to them. Initial NIHSS deemed to be 12. CTH/CTA showed distal LM1 occlusion + dense MCA sign. Given TPA and transferred to [facility] for possible endovascular. On arrival, pt alert awake, states she was feeling very drowsy night prior; responding to most questions in limited english (Syriac  not available from pacific interpreters), crossed midline, mild R nasolabial flattening, otherwise symmetrically full strength on gross motor exam. CTP study w/o any mismatch or core; combined with significant neurological improvement; pt excluded from endovascular intervention. Reported baseline independent of ADLs. Admitted to stroke unit for further management. Upon presentation to [facility]: NIHSS =1, MRS =0.      PAST MEDICAL & SURGICAL HISTORY:  CAD (coronary artery disease)  Seizures  HLD (hyperlipidemia)  HTN (hypertension)  Cardiac pacemaker  History of hysterectomy          MEDICATIONS:  aspirin enteric coated 81 milliGRAM(s) Oral daily  enoxaparin Injectable 40 milliGRAM(s) SubCutaneous daily        acetaminophen   Tablet .. 650 milliGRAM(s) Oral every 6 hours PRN  phenytoin   Capsule 100 milliGRAM(s) Oral every 12 hours    docusate sodium 100 milliGRAM(s) Oral three times a day  senna 2 Tablet(s) Oral at bedtime    atorvastatin 40 milliGRAM(s) Oral at bedtime    sodium chloride 0.9%. 1000 milliLiter(s) IV Continuous <Continuous>      FAMILY HISTORY:      SOCIAL HISTORY:    [ ] Non-smoker  [ ] Smoker  [ ] Alcohol    Allergies    No Known Allergies    Intolerances    	    REVIEW OF SYSTEMS:  CONSTITUTIONAL: No fever, weight loss, + fatigue  EYES: No eye pain, visual disturbances, or discharge  ENMT:  No difficulty hearing, tinnitus, vertigo; No sinus or throat pain  NECK: No pain or stiffness  RESPIRATORY: No cough, wheezing, chills or hemoptysis; No Shortness of Breath  CARDIOVASCULAR: No chest pain, palpitations, passing out, dizziness, or leg swelling  GASTROINTESTINAL: No abdominal or epigastric pain. No nausea, vomiting, or hematemesis; No diarrhea or constipation. No melena or hematochezia.  GENITOURINARY: No dysuria, frequency, hematuria, or incontinence  NEUROLOGICAL: No headaches, memory loss, ++loss of strength, numbness, or tremors  SKIN: No itching, burning, rashes, or lesions   LYMPH Nodes: No enlarged glands  ENDOCRINE: No heat or cold intolerance; No hair loss  MUSCULOSKELETAL: No joint pain or swelling; No muscle, back, or extremity pain  PSYCHIATRIC: No depression, anxiety, mood swings, or difficulty sleeping  HEME/LYMPH: No easy bruising, or bleeding gums  ALLERY AND IMMUNOLOGIC: No hives or eczema	    [ ] All others negative	  [ ] Unable to obtain    PHYSICAL EXAM:  T(C): 36.5 (08-15-19 @ 08:00), Max: 36.6 (08-15-19 @ 07:24)  HR: 75 (08-15-19 @ 08:00) (60 - 75)  BP: 156/94 (08-15-19 @ 08:00) (105/67 - 159/87)  RR: 18 (08-15-19 @ 08:00) (14 - 25)  SpO2: 100% (08-15-19 @ 08:00) (95% - 100%)  Wt(kg): --  I&O's Summary      Appearance: NAD  HEENT:   Normal oral mucosa, PERRL, EOMI	  Lymphatic: No lymphadenopathy  Cardiovascular: Normal S1 S2, No JVD, No murmurs, No edema  Respiratory: Lungs clear to auscultation	  Psychiatry: A & O x 3, Mood & affect appropriate  Gastrointestinal:  Soft, Non-tender, + BS	  Skin: No rashes, No ecchymoses, No cyanosis	  Extremities: Normal range of motion, No clubbing, cyanosis or edema  Vascular: Peripheral pulses palpable 2+ bilaterally  NEUROLOGICAL EXAM:  Mental status: Awake, alert, oriented x3, no neglect, follows commands.  Cranial Nerves: mild facial asymmetry, no nystagmus, no dysarthria,  tongue midline  Motor exam: Normal tone, no drift, 5/5 RUE, 5/5 RLE, 5/5 LUE, 5/5 LLE, normal fine finger movements.  Sensation: Intact to light touch   Coordination/ Gait: No dysmetria, gait not tested    TELEMETRY: V Paced 	    ECG:  	V paced   RADIOLOGY:      < from: Xray Chest 1 View AP/PA (08.14.19 @ 00:21) >    EXAM:  XR CHEST AP OR PA 1V                            PROCEDURE DATE:  08/14/2019            INTERPRETATION:  Indication: Code stroke    Technique: Single AP view of the chest.    Comparison: None.    Findings: The cardiac silhouette is normal in size. There is a left-sided   single lead pacemaker. There are no focal consolidations or pleural   effusions. The hilar and mediastinal structures appear unremarkable.    Impression: Clear lungs.                    DANA LEE M.D., ATTENDING RADIOLOGIST  This document has been electronically signed. Aug 14 2019  9:06AM                < end of copied text >      < from: CT Head No Cont (08.14.19 @ 20:20) >    EXAM:  CT BRAIN                            PROCEDURE DATE:  08/14/2019            INTERPRETATION:  History: Stroke status post TPA. Aphasia and right   nasolabial flattening.    Description: A noncontrast head CT was performed.    Comparison is made to a prior CT from 08/13/2019.    The previously noted hypodensities involving the anterior left insular   cortex and posterior limb left internal capsule region are not as   conspicuous on the current study. No new areas of loss of the gray matter   white matter junction are appreciated. There is no evidence for   intracranial hemorrhage.    Chronic white matter changes and cerebral volume loss are again noted.    IMPRESSION:    No evidence for intracranial hemorrhage. No focal areas of loss ofthe   gray matter white matter junction are appreciated.                    ARACELIS KATE M.D., ATTENDING RADIOLOGIST  This document has been electronically signed. Aug 15 2019  8:19AM                < end of copied text >    OTHER: 	  	  LABS:	 	    CARDIAC MARKERS:                                  15.5   6.0   )-----------( 203      ( 15 Aug 2019 07:02 )             49.2     08-15    138  |  102  |  12  ----------------------------<  99  5.3   |  28  |  0.82    Ca    10.7<H>      15 Aug 2019 07:01  Mg     2.2     08-14    TPro  6.6  /  Alb  3.6  /  TBili  0.3  /  DBili  x   /  AST  33  /  ALT  22  /  AlkPhos  66  08-14    proBNP:   Lipid Profile:   HgA1c:   TSH:
HPI:  Patient is a 84 year old RH [place] lady  with Hx of Seizures on dilantin, HTN, HLD, CAD s/p PPM and hysterectomy presented to the [facility] ED with son-in-law c/o sudden onset verbal unresponsiveness at 19:00 . Per son in law, [place] recently [place] the [place] [facility] 1.5 days ago. Patient was watching TV with her grandchildren earlier this evening, when they notice she was not responding to them. Initial NIHSS deemed to be 12. CTH/CTA showed distal LM1 occlusion + dense MCA sign. Given TPA and transferred to [facility] for possible endovascular. On arrival, pt alert awake, states she was feeling very drowsy night prior; responding to most questions in limited english (Irish  not available from pacific interpreters), crossed midline, mild R nasolabial flattening, otherwise symmetrically full strength on gross motor exam. CTP study w/o any mismatch or core; combined with significant neurological improvement; pt excluded from endovascular intervention. Reported baseline independent of ADLs. Admit to stroke unit for further management. NIHSS =1, MRS =0  **Possible error in CTA report from [facility] - mentions RM1 occlusion in impression as well as LMca   [facility] name : Casandra Ortega MRN #[id number] (13 Aug 2019 22:49)    Neurology was consulted to evaluate if the patient had a seizure. The patient had her first episode of seizure 5-6 years ago in [facility] during the middle of the night. She endorses that she loss consciousness during the event and she ended up in the hospital where she was told she experienced a seizure. She noticed that she bit her lip during the incident but did not experience any bowel or urinary incontinence. Her second seizure occurred 3 years ago which was very similar to the first time she experienced her seizure. This time she was prescribed dilantin but she stopped taking the medication as she problems with her vision while she was on it. The patient had her third seizure 2 months after her second seizure and she was prescribed 100mg of dilantin BID. She has been taking her seizure medication daily since then and has not ever had a similar episode since her third episode.        PAST MEDICAL & SURGICAL HISTORY:  CAD (coronary artery disease)  Seizures  HLD (hyperlipidemia)  HTN (hypertension)  Cardiac pacemaker  History of hysterectomy      MEDICATIONS (HOME):      MEDICATIONS  (STANDING):  aspirin enteric coated 81 milliGRAM(s) Oral daily  atorvastatin 40 milliGRAM(s) Oral at bedtime  docusate sodium 100 milliGRAM(s) Oral three times a day  enoxaparin Injectable 40 milliGRAM(s) SubCutaneous daily  phenytoin   Capsule 100 milliGRAM(s) Oral every 12 hours  senna 2 Tablet(s) Oral at bedtime  sodium chloride 0.9%. 1000 milliLiter(s) (85 mL/Hr) IV Continuous <Continuous>    MEDICATIONS  (PRN):  acetaminophen   Tablet .. 650 milliGRAM(s) Oral every 6 hours PRN Mild Pain (1 - 3)    ALLERGIES/INTOLERANCES:  Allergies  No Known Allergies    VITALS & EXAMINATION:  Vital Signs Last 24 Hrs  T(C): 36.6 (15 Aug 2019 12:15), Max: 36.6 (15 Aug 2019 07:24)  T(F): 97.9 (15 Aug 2019 12:15), Max: 97.9 (15 Aug 2019 07:24)  HR: 60 (15 Aug 2019 14:00) (60 - 81)  BP: 104/57 (15 Aug 2019 14:00) (104/57 - 164/94)  BP(mean): 73 (15 Aug 2019 14:00) (70 - 111)  RR: 18 (15 Aug 2019 14:00) (14 - 25)  SpO2: 97% (15 Aug 2019 14:00) (95% - 100%)    General:  Constitutional: Female, appears stated age, in no apparent distress including pain  Head: Normocephalic & atraumatic.  ENT: neck supple, no lymphadenopathy.   Respiratory: Patent airway. All lung fields are clear to auscultation bilaterally.  Extremities: No cyanosis, clubbing, or edema.  Skin: No rashes, bruising, or discoloration.    Cardiovascular (>2): RRR no murmurs. Normal capillary beds refill, 1-2 seconds or less.     Neurological (>12):  MS: Awake, alert, oriented to person, place, situation, time. Normal affect. Follows all commands. Recall 0/3.    Language: Speech is clear, fluent with good repetition & comprehension    CNs: PERRLA (R = 3mm, L = 3mm). VFF. EOMI no nystagmus, no diplopia. V1-3 intact to LT/pinprick, well developed masseter muscles b/l. No facial asymmetry b/l, full eye closure strength b/l. Hearing grossly normal (rubbing fingers) b/l. Symmetric palate elevation in midline. Gag reflex deferred. Head turning & shoulder shrug intact b/l. Tongue midline, normal movements, no atrophy.    Fundoscopic: not performed    Motor: Normal muscle bulk & tone. No noticeable tremor or seizure. No pronator drift.              Deltoid	Biceps	Triceps	Wrist	Finger ABd	   R	4	4	4	5	5		5 	  L	5	5	5	5	5		5  *right arm was pain limited     	H-Flex	K-Ext	D-Flex	P-Flex  R	5	5	5	5	   L	5	5	5	5	     Sensation: Intact to LT/PP b/l throughout.     Reflexes:              Biceps(C5)       BR(C6)     Triceps(C7)               Patellar(L4)    Achilles(S1)    Plantar Resp  R	2	          2	             2		        2		    2		Down   L	2	          2	             2		        2		    2		Down     Coordination: intact rapid-alt movements. No dysmetria to FTN/HTS    Gait: Normal Romberg. No postural instability. Normal stance and tandem gait.     LABORATORY:  CBC                       15.5   6.0   )-----------( 203      ( 15 Aug 2019 07:02 )             49.2     Chem 08-15    138  |  102  |  12  ----------------------------<  99  5.3   |  28  |  0.82    Ca    10.7<H>      15 Aug 2019 07:01  Mg     2.2     08-    TPro  6.6  /  Alb  3.6  /  TBili  0.3  /  DBili  x   /  AST  33  /  ALT  22  /  AlkPhos  66      LFTs LIVER FUNCTIONS - ( 14 Aug 2019 05:12 )  Alb: 3.6 g/dL / Pro: 6.6 g/dL / ALK PHOS: 66 U/L / ALT: 22 U/L / AST: 33 U/L / GGT: x           Coagulopathy PT/INR - ( 13 Aug 2019 22:40 )   PT: 10.4 sec;   INR: 0.91 ratio         PTT - ( 13 Aug 2019 22:40 )  PTT:22.1 sec  Lipid Panel  Chol 174 LDL 78 HDL 85 Trig 57  A1c  [id number] 5.3    Cardiac enzymes     U/A Urinalysis Basic - ( 13 Aug 2019 23:39 )    Color: Colorless / Appearance: Clear / S.024 / pH: x  Gluc: x / Ketone: Negative  / Bili: Negative / Urobili: Negative   Blood: x / Protein: Negative / Nitrite: Negative   Leuk Esterase: Negative / RBC: 2 /hpf / WBC 2 /HPF   Sq Epi: x / Non Sq Epi: 1 /hpf / Bacteria: Negative    Radiology (XR, CT, MR, U/S, TTE/YVETTE):  CT Perfusion w/Maps w/IV( @ 22:55)      IMPRESSION:  	Perfusion imaging predicted core infarct of 0 ml within the left MCA  Territory. Based on the perfusion mismatch, there is a predicted volume of 0 ml of critically hypoperfused tissue at risk. Mismatch                    ratio: None    CT Brain Stroke Protocol(@23:01) post-tPA    IMPRESSION:                   No acute intracranial hemorrhage in this patient status post TPA administration for left middle cerebral artery occlusion. Early ischemic change in the anterior left insular cortex and posterior limb of                    the internal capsule.    CT Head No Cont( @ 20:20)      Comparison is made to a prior CT from 2019.                  The previously noted hypodensities involving the anterior left insular cortex and posterior limb left internal capsule region are not as conspicuous on the current study. No new areas of loss of the                             gray matter white matter junction are appreciated. There is no evidence for intracranial hemorrhage. Chronic white matter changes and cerebral volume loss are again noted.       IMPRESSION:                  No evidence for intracranial hemorrhage. No focal areas of loss of the gray matter white matter junction are appreciated.    CT Head No Cont(8/15 @ 12:08)       IMPRESSION:  Age-appropriate involutional change. No evidence of acute major vessel distribution infarct

## 2019-08-15 NOTE — DISCHARGE NOTE PROVIDER - NSDCCPCAREPLAN_GEN_ALL_CORE_FT
PRINCIPAL DISCHARGE DIAGNOSIS  Diagnosis: Cerebrovascular accident (CVA), unspecified mechanism  Assessment and Plan of Treatment: Please follow up with neurologist in 1 week. The office will call you to schedule an appointment, if you do not hear from them please call [phone]. Continue taking medications as prescribed. If you were prescribed a statin for your cholesterol please make sure you have your liver enzymes checked with your primary care physician. Monitor your blood pressure. Reduce fat, cholesterol and salt in your diet. Increase intake of fruits and vegetables. Limit alcohol to minimum and do not smoke. You may be at risk for falling, make changes to your home to help you walk easier. Keep up to date on vaccinations.  If you experience any symptoms of facial drooping, slurred speech, arm or leg weakness, severe headache, vision changes or any worsening symptoms, notify provider immediately and return to ER.

## 2019-08-15 NOTE — PROGRESS NOTE ADULT - SUBJECTIVE AND OBJECTIVE BOX
THE PATIENT WAS SEEN AND EXAMINED BY ME WITH THE HOUSESTAFF AND STROKE TEAM DURING MORNING ROUNDS.   HPI:  84 year old ?RH Nigerian lady  with Hx of Seizures on dilantin, HTN, HLD, CAD s/p PPM and hysterectomy presented to the [facility] ED with son-in-law c/o sudden onset verbal unresponsiveness at 19:00 on 8/13. Per son in law, [place] recently [place] the [place] from [facility] 1.5 days prior to admission. Patient was watching TV with her grandchildren when they noticed she was not responding to them. Initial NIHSS deemed to be 12. CTH/CTA showed distal LM1 occlusion + dense MCA sign. Given TPA and transferred to [facility] for possible endovascular. On arrival, pt alert awake, states she was feeling very drowsy night prior; responding to most questions in limited english (Uzbek  not available from pacific interpreters), crossed midline, mild R nasolabial flattening, otherwise symmetrically full strength on gross motor exam. CTP study w/o any mismatch or core; combined with significant neurological improvement; pt excluded from endovascular intervention. Reported baseline independent of ADLs. Admitted to stroke unit for further management. Upon presentation to [facility]: NIHSS =1, MRS =0.    SUBJECTIVE: No events overnight.  No new neurologic complaints.      acetaminophen   Tablet .. 650 milliGRAM(s) Oral every 6 hours PRN  aspirin enteric coated 81 milliGRAM(s) Oral daily  atorvastatin 80 milliGRAM(s) Oral at bedtime  enoxaparin Injectable 40 milliGRAM(s) SubCutaneous daily  phenytoin   Capsule 100 milliGRAM(s) Oral every 12 hours  sodium chloride 0.9%. 1000 milliLiter(s) IV Continuous <Continuous>    PHYSICAL EXAM:   Vital Signs Last 24 Hrs  T(C): 36.4 (14 Aug 2019 19:00), Max: 36.4 (14 Aug 2019 06:56)  T(F): 97.5 (14 Aug 2019 19:00), Max: 97.5 (14 Aug 2019 06:56)  HR: 67 (15 Aug 2019 04:00) (60 - 75)  BP: 159/87 (15 Aug 2019 04:00) (105/67 - 159/87)  BP(mean): 107 (15 Aug 2019 04:00) (70 - 115)  RR: 23 (15 Aug 2019 04:00) (14 - 25)  SpO2: 100% (15 Aug 2019 04:00) (95% - 100%)    General: No acute distress  HEENT: EOM intact, visual fields full  Abdomen: Soft, nontender, nondistended   Extremities: No edema    NEUROLOGICAL EXAM:  Mental status: Awake, alert, oriented x3, no neglect, follows commands.  Cranial Nerves: mild facial asymmetry, no nystagmus, no dysarthria,  tongue midline  Motor exam: Normal tone, no drift, 5/5 RUE, 5/5 RLE, 5/5 LUE, 5/5 LLE, normal fine finger movements.  Sensation: Intact to light touch   Coordination/ Gait: No dysmetria, gait not tested    LABS:                        15.8   5.7   )-----------( 174      ( 15 Aug 2019 04:55 )             49.3    08-15    138  |  105  |  12  ----------------------------<  90  5.4<H>   |  23  |  0.75    Ca    10.2      15 Aug 2019 04:55  Mg     2.2     08-14    TPro  6.6  /  Alb  3.6  /  TBili  0.3  /  DBili  x   /  AST  33  /  ALT  22  /  AlkPhos  66  08-14  PT/INR - ( 13 Aug 2019 22:40 )   PT: 10.4 sec;   INR: 0.91 ratio       PTT - ( 13 Aug 2019 22:40 )  PTT:22.1 sec  Hemoglobin A1C, Whole Blood: 5.3 % (08-14 @ 08:39)    IMAGING: Reviewed by me.     CT Head No Cont (08.13.19)  No acute intracranial hemorrhage in this patient status post TPA   administration for left middle cerebral artery occlusion. Early ischemic   change in the anterior left insular cortex and posterior limb of the   internal capsule.    CT Perfusion (08.13.19)  CBF < 30%: 0 ml  TMax > 6s: 0 ml  Mismatch volume: 0 ml  Mismatch ratio: None  Perfusion imaging predicted core infarct of 0 ml within the left MCA   Territory. Based on the perfusion mismatch, there is a predicted volume   of 0 ml of critically hypoperfused tissue at risk. Mismatch ratio: None. THE PATIENT WAS SEEN AND EXAMINED BY ME WITH THE HOUSESTAFF AND STROKE TEAM DURING MORNING ROUNDS.   HPI:  84 year old ?RH [place]ian lady  with Hx of Seizures on dilantin, HTN, HLD, CAD s/p PPM and hysterectomy presented to the [facility] ED with son-in-law c/o sudden onset verbal unresponsiveness at 19:00 on 8/13. Per son in law, [place] recently [place] [facility] 1.5 days prior to admission. Patient was watching TV with her grandchildren when they noticed she was not responding to them. Initial NIHSS deemed to be 12. CTH/CTA showed distal LM1 occlusion + dense MCA sign. Given TPA and transferred to [facility] for possible endovascular. On arrival, pt alert awake, states she was feeling very drowsy night prior; responding to most questions in limited english (Nepali  not available from pacific interpreters), crossed midline, mild R nasolabial flattening, otherwise symmetrically full strength on gross motor exam. CTP study w/o any mismatch or core; combined with significant neurological improvement; pt excluded from endovascular intervention. Reported baseline independent of ADLs. Admitted to stroke unit for further management. Upon presentation to [facility]: NIHSS =1, MRS =0.    SUBJECTIVE: No events overnight.  No new neurologic complaints.      acetaminophen   Tablet .. 650 milliGRAM(s) Oral every 6 hours PRN  aspirin enteric coated 81 milliGRAM(s) Oral daily  atorvastatin 80 milliGRAM(s) Oral at bedtime  enoxaparin Injectable 40 milliGRAM(s) SubCutaneous daily  phenytoin   Capsule 100 milliGRAM(s) Oral every 12 hours  sodium chloride 0.9%. 1000 milliLiter(s) IV Continuous <Continuous>    PHYSICAL EXAM:   Vital Signs Last 24 Hrs  T(C): 36.4 (14 Aug 2019 19:00), Max: 36.4 (14 Aug 2019 06:56)  T(F): 97.5 (14 Aug 2019 19:00), Max: 97.5 (14 Aug 2019 06:56)  HR: 67 (15 Aug 2019 04:00) (60 - 75)  BP: 159/87 (15 Aug 2019 04:00) (105/67 - 159/87)  BP(mean): 107 (15 Aug 2019 04:00) (70 - 115)  RR: 23 (15 Aug 2019 04:00) (14 - 25)  SpO2: 100% (15 Aug 2019 04:00) (95% - 100%)    General: No acute distress, sitting upright eating breakfast  HEENT: EOM intact, visual fields full  Abdomen: Soft, nontender, nondistended   Extremities: No edema    NEUROLOGICAL EXAM:  Mental status: Awake, alert, oriented x3, no neglect, follows commands, speech fluent  Cranial Nerves: mild facial asymmetry, no nystagmus, no dysarthria,  tongue midline  Motor exam: Extremities move well against gravity   Sensation: Intact to light touch   Coordination/ Gait: No dysmetria, gait not tested    LABS:                        15.8   5.7   )-----------( 174      ( 15 Aug 2019 04:55 )             49.3    08-15    138  |  105  |  12  ----------------------------<  90  5.4<H>   |  23  |  0.75    Ca    10.2      15 Aug 2019 04:55  Mg     2.2     08-14    TPro  6.6  /  Alb  3.6  /  TBili  0.3  /  DBili  x   /  AST  33  /  ALT  22  /  AlkPhos  66  08-14  PT/INR - ( 13 Aug 2019 22:40 )   PT: 10.4 sec;   INR: 0.91 ratio       PTT - ( 13 Aug 2019 22:40 )  PTT:22.1 sec  Hemoglobin A1C, Whole Blood: 5.3 % (08-14 @ 08:39)    IMAGING: Reviewed by me.     CT Head No Cont (08.15.19)  Age-appropriate involutional change. No evidence of acute   major vessel distribution infarct.    CT Head No Cont (08.13.19)  No acute intracranial hemorrhage in this patient status post TPA   administration for left middle cerebral artery occlusion. Early ischemic   change in the anterior left insular cortex and posterior limb of the   internal capsule.    CT Perfusion (08.13.19)  CBF < 30%: 0 ml  TMax > 6s: 0 ml  Mismatch volume: 0 ml  Mismatch ratio: None  Perfusion imaging predicted core infarct of 0 ml within the left MCA   Territory. Based on the perfusion mismatch, there is a predicted volume   of 0 ml of critically hypoperfused tissue at risk. Mismatch ratio: None.

## 2019-08-15 NOTE — CONSULT NOTE ADULT - ASSESSMENT
85 yo female admitted to the Stroke unit with Left MCA Ischemia with Left M1 distal occlusion versus complex partial seizure

## 2019-10-31 PROCEDURE — 93970 EXTREMITY STUDY: CPT

## 2019-10-31 PROCEDURE — 83036 HEMOGLOBIN GLYCOSYLATED A1C: CPT

## 2019-10-31 PROCEDURE — 85027 COMPLETE CBC AUTOMATED: CPT

## 2019-10-31 PROCEDURE — 84436 ASSAY OF TOTAL THYROXINE: CPT

## 2019-10-31 PROCEDURE — 97162 PT EVAL MOD COMPLEX 30 MIN: CPT

## 2019-10-31 PROCEDURE — 83735 ASSAY OF MAGNESIUM: CPT

## 2019-10-31 PROCEDURE — 80061 LIPID PANEL: CPT

## 2019-10-31 PROCEDURE — 82607 VITAMIN B-12: CPT

## 2019-10-31 PROCEDURE — 97165 OT EVAL LOW COMPLEX 30 MIN: CPT

## 2019-10-31 PROCEDURE — 93005 ELECTROCARDIOGRAM TRACING: CPT

## 2019-10-31 PROCEDURE — 80053 COMPREHEN METABOLIC PANEL: CPT

## 2019-10-31 PROCEDURE — 84443 ASSAY THYROID STIM HORMONE: CPT

## 2019-10-31 PROCEDURE — 99291 CRITICAL CARE FIRST HOUR: CPT

## 2019-10-31 PROCEDURE — 70450 CT HEAD/BRAIN W/O DYE: CPT

## 2019-10-31 PROCEDURE — 86850 RBC ANTIBODY SCREEN: CPT

## 2019-10-31 PROCEDURE — 95816 EEG AWAKE AND DROWSY: CPT

## 2019-10-31 PROCEDURE — 71045 X-RAY EXAM CHEST 1 VIEW: CPT

## 2019-10-31 PROCEDURE — 86901 BLOOD TYPING SEROLOGIC RH(D): CPT

## 2019-10-31 PROCEDURE — 86900 BLOOD TYPING SEROLOGIC ABO: CPT

## 2019-10-31 PROCEDURE — 82746 ASSAY OF FOLIC ACID SERUM: CPT

## 2019-10-31 PROCEDURE — 80048 BASIC METABOLIC PNL TOTAL CA: CPT

## 2020-01-03 NOTE — DISCHARGE NOTE NURSING/CASE MANAGEMENT/SOCIAL WORK - NSDCPEPTSTRK_GEN_ALL_CORE
Prescribed medications/Risk factors for stroke/Stroke support groups for patients, families, and friends/Call 911 for stroke/Stroke warning signs and symptoms/Need for follow up after discharge/Signs and symptoms of stroke/Stroke education booklet
Universal Safety Interventions
Never smoker

## 2020-12-23 NOTE — ED PROVIDER NOTE - CCCP TRG CHIEF CMPLNT
Eufemia Graves,  Please call Ms. Solo and schedule an appointment. She will be expecting your call. Her contact P [phone]  Thank you,  carola Pierce note:    Please inform patient HbA1c continues to be elevated, cholesterol continues to be elevated. Patient to make appointment to adjust medications.  Thanks
Scheduled appt.
stroke rescue

## 2021-03-24 NOTE — DISCHARGE NOTE PROVIDER - NSDCQMSTROKE_NEU_ALL_CORE
Yes... DISPLAY PLAN FREE TEXT DISPLAY PLAN FREE TEXT DISPLAY PLAN FREE TEXT DISPLAY PLAN FREE TEXT DISPLAY PLAN FREE TEXT DISPLAY PLAN FREE TEXT DISPLAY PLAN FREE TEXT DISPLAY PLAN FREE TEXT DISPLAY PLAN FREE TEXT DISPLAY PLAN FREE TEXT DISPLAY PLAN FREE TEXT

## 2022-08-30 NOTE — CONSULT NOTE ADULT - PROBLEM SELECTOR PROBLEM 1
no obvious edema,  no obvious murmurs,  regular rate and rhythm
Cerebrovascular accident (CVA), unspecified mechanism

## 2024-09-10 NOTE — STROKE CODE NOTE - NIH STROKE SCALE: 1A. LEVEL OF CONSCIOUSNESS, QM
Upper Respiratory Infection, Adult  An upper respiratory infection (URI) is a common viral infection of the nose, throat, and upper air passages that lead to the lungs. The most common type of URI is the common cold. URIs usually get better on their own, without medical treatment.  What are the causes?  A URI is caused by a virus. You may catch a virus by:  Breathing in droplets from an infected person's cough or sneeze.  Touching something that has been exposed to the virus (is contaminated) and then touching your mouth, nose, or eyes.  What increases the risk?  You are more likely to get a URI if:  You are very young or very old.  You have close contact with others, such as at work, school, or a health care facility.  You smoke.  You have long-term (chronic) heart or lung disease.  You have a weakened disease-fighting system (immune system).  You have nasal allergies or asthma.  You are experiencing a lot of stress.  You have poor nutrition.  What are the signs or symptoms?  A URI usually involves some of the following symptoms:  Runny or stuffy (congested) nose.  Cough.  Sneezing.  Sore throat.  Headache.  Fatigue.  Fever.  Loss of appetite.  Pain in your forehead, behind your eyes, and over your cheekbones (sinus pain).  Muscle aches.  Redness or irritation of the eyes.  Pressure in the ears or face.  How is this diagnosed?  This condition may be diagnosed based on your medical history and symptoms, and a physical exam. Your health care provider may use a swab to take a mucus sample from your nose (nasal swab). This sample can be tested to determine what virus is causing the illness.  How is this treated?  URIs usually get better on their own within 7-10 days. Medicines cannot cure URIs, but your health care provider may recommend certain medicines to help relieve symptoms, such as:  Over-the-counter cold medicines.  Cough suppressants. Coughing is a type of defense against infection that helps to clear the  respiratory system, so take these medicines only as recommended by your health care provider.  Fever-reducing medicines.  Follow these instructions at home:  Activity  Rest as needed.  If you have a fever, stay home from work or school until your fever is gone or until your health care provider says your URI cannot spread to other people (is no longer contagious). Your health care provider may have you wear a face mask to prevent your infection from spreading.  Relieving symptoms  Gargle with a mixture of salt and water 3-4 times a day or as needed. To make salt water, completely dissolve ½-1 tsp (3-6 g) of salt in 1 cup (237 mL) of warm water.  Use a cool-mist humidifier to add moisture to the air. This can help you breathe more easily.  Eating and drinking    Drink enough fluid to keep your urine pale yellow.  Eat soups and other clear broths.  General instructions    Take over-the-counter and prescription medicines only as told by your health care provider. These include cold medicines, fever reducers, and cough suppressants.  Do not use any products that contain nicotine or tobacco. These products include cigarettes, chewing tobacco, and vaping devices, such as e-cigarettes. If you need help quitting, ask your health care provider.  Stay away from secondhand smoke.  Stay up to date on all immunizations, including the yearly (annual) flu vaccine.  Keep all follow-up visits. This is important.  How to prevent the spread of infection to others  URIs can be contagious. To prevent the infection from spreading:  Wash your hands with soap and water for at least 20 seconds. If soap and water are not available, use hand .  Avoid touching your mouth, face, eyes, or nose.  Cough or sneeze into a tissue or your sleeve or elbow instead of into your hand or into the air.    Contact a health care provider if:  You are getting worse instead of better.  You have a fever or chills.  Your mucus is brown or red.  You have  yellow or brown discharge coming from your nose.  You have pain in your face, especially when you bend forward.  You have swollen neck glands.  You have pain while swallowing.  You have white areas in the back of your throat.  Get help right away if:  You have shortness of breath that gets worse.  You have severe or persistent:  Headache.  Ear pain.  Sinus pain.  Chest pain.  You have chronic lung disease along with any of the following:  Making high-pitched whistling sounds when you breathe, most often when you breathe out (wheezing).  Prolonged cough (more than 14 days).  Coughing up blood.  A change in your usual mucus.  You have a stiff neck.  You have changes in your:  Vision.  Hearing.  Thinking.  Mood.  These symptoms may be an emergency. Get help right away. Call 911.  Do not wait to see if the symptoms will go away.  Do not drive yourself to the hospital.  Summary  An upper respiratory infection (URI) is a common infection of the nose, throat, and upper air passages that lead to the lungs.  A URI is caused by a virus.  URIs usually get better on their own within 7-10 days.  Medicines cannot cure URIs, but your health care provider may recommend certain medicines to help relieve symptoms.  This information is not intended to replace advice given to you by your health care provider. Make sure you discuss any questions you have with your health care provider.  Document Revised: 07/20/2022 Document Reviewed: 07/20/2022  [employer] Patient Education © 2024 [employer]  Sore Throat  A sore throat is pain, burning, irritation, or scratchiness in the throat. When you have a sore throat, you may feel pain or tenderness in your throat when you swallow or talk.  Many things can cause a sore throat, including:  An infection.  Seasonal allergies.  Dryness in the air.  Irritants, such as smoke or pollution.  Radiation treatment for cancer.  Gastroesophageal reflux disease (GERD).  A tumor.  A sore throat is often the  first sign of another sickness. It may happen with other symptoms, such as coughing, sneezing, fever, and swollen neck glands. Most sore throats go away without medical treatment.  Follow these instructions at home:         Medicines  Take over-the-counter and prescription medicines only as told by your health care provider.  Children often get sore throats. Do not give your child aspirin because of the association with Reye's syndrome.  Use throat sprays to soothe your throat as told by your health care provider.  Managing pain  To help with pain, try:  Sipping warm liquids, such as broth, herbal tea, or warm water.  Eating or drinking cold or frozen liquids, such as frozen ice pops.  Gargling with a mixture of salt and water 3-4 times a day or as needed. To make salt water, completely dissolve ½-1 tsp (3-6 g) of salt in 1 cup (237 mL) of warm water.  Sucking on hard candy or throat lozenges.  Putting a cool-mist humidifier in your bedroom at night to moisten the air.  Sitting in the bathroom with the door closed for 5-10 minutes while you run hot water in the shower.  General instructions  Do not use any products that contain nicotine or tobacco. These products include cigarettes, chewing tobacco, and vaping devices, such as e-cigarettes. If you need help quitting, ask your health care provider.  Rest as needed.  Drink enough fluid to keep your urine pale yellow.  Wash your hands often with soap and water for at least 20 seconds. If soap and water are not available, use hand .  Contact a health care provider if:  You have a fever for more than 2-3 days.  You have symptoms that last for more than 2-3 days.  Your throat does not get better within 7 days.  You have a fever and your symptoms suddenly get worse.  Get help right away if:  You have difficulty breathing.  You cannot swallow fluids, soft foods, or your saliva.  You have increased swelling in your throat or neck.  You have persistent nausea and  vomiting.  These symptoms may represent a serious problem that is an emergency. Do not wait to see if the symptoms will go away. Get medical help right away. Call your local emergency services (911 in the [place] Do not drive yourself to the hospital.  Summary  A sore throat is pain, burning, irritation, or scratchiness in the throat. Many things can cause a sore throat.  Take over-the-counter medicines only as told by your health care provider.  Rest as needed.  Drink enough fluid to keep your urine pale yellow.  Contact a health care provider if your throat does not get better within 7 days.  This information is not intended to replace advice given to you by your health care provider. Make sure you discuss any questions you have with your health care provider.  Document Revised: 03/16/2022 Document Reviewed: 03/16/2022  [employer] Patient Education © 2024 [employer]     (0) Alert; keenly responsive